# Patient Record
Sex: MALE | Race: WHITE | NOT HISPANIC OR LATINO | Employment: FULL TIME | URBAN - METROPOLITAN AREA
[De-identification: names, ages, dates, MRNs, and addresses within clinical notes are randomized per-mention and may not be internally consistent; named-entity substitution may affect disease eponyms.]

---

## 2020-04-01 ENCOUNTER — OFFICE VISIT (OUTPATIENT)
Dept: URGENT CARE | Facility: CLINIC | Age: 55
End: 2020-04-01
Payer: OTHER GOVERNMENT

## 2020-04-01 VITALS
SYSTOLIC BLOOD PRESSURE: 134 MMHG | HEART RATE: 68 BPM | TEMPERATURE: 98 F | OXYGEN SATURATION: 97 % | HEIGHT: 66 IN | BODY MASS INDEX: 25.71 KG/M2 | DIASTOLIC BLOOD PRESSURE: 80 MMHG | WEIGHT: 160 LBS

## 2020-04-01 DIAGNOSIS — M25.461 SWELLING OF RIGHT KNEE JOINT: ICD-10-CM

## 2020-04-01 DIAGNOSIS — S83.91XA SPRAIN OF RIGHT KNEE, UNSPECIFIED LIGAMENT, INITIAL ENCOUNTER: Primary | ICD-10-CM

## 2020-04-01 PROCEDURE — 99204 OFFICE O/P NEW MOD 45 MIN: CPT | Mod: S$GLB,,, | Performed by: PREVENTIVE MEDICINE

## 2020-04-01 PROCEDURE — 99204 PR OFFICE/OUTPT VISIT, NEW, LEVL IV, 45-59 MIN: ICD-10-PCS | Mod: S$GLB,,, | Performed by: PREVENTIVE MEDICINE

## 2020-04-01 RX ORDER — MELOXICAM 7.5 MG/1
7.5 TABLET ORAL 2 TIMES DAILY WITH MEALS
Qty: 30 TABLET | Refills: 1 | Status: SHIPPED | OUTPATIENT
Start: 2020-04-01 | End: 2020-04-13 | Stop reason: SDUPTHER

## 2020-04-01 NOTE — PROGRESS NOTES
Subjective:       Patient ID: Oscar Oswald is a 54 y.o. male.    Chief Complaint: Knee Injury (right)    NEW WC-Pt is being seen today for his right knee injury - [place] -  DOI  0325/2020 . 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 The current episode started in the past 7 days. Pertinent negatives include no arthralgias, chest pain, chills, congestion, myalgias, numbness or weakness. The symptoms are aggravated by walking. He has tried ice for the symptoms. The treatment provided mild relief.       Constitution: Negative for chills.   HENT: Negative for congestion, sinus pain and sinus pressure.    Cardiovascular: Negative for chest pain.   Musculoskeletal: Positive for pain with walking. Negative for pain, joint pain, muscle cramps and muscle ache.   Allergic/Immunologic: Negative for itching and sneezing.   Neurological: Negative for numbness and tingling.        Objective:      Physical Exam   Constitutional: He is oriented to person, place, and time. He appears well-developed and well-nourished.   HENT:   Head: Normocephalic.   Eyes: Pupils are equal, round, and reactive to light.   Neck: Normal range of motion.   Cardiovascular: Normal rate.   Pulmonary/Chest: Effort normal.   Musculoskeletal:        Right knee: He exhibits decreased range of motion and swelling. He exhibits no effusion, no ecchymosis, no deformity, no laceration, no erythema, normal alignment and no LCL laxity. Tenderness found. No medial joint line, no lateral joint line, no MCL, no LCL and no patellar tendon tenderness noted.        Legs:  Swelling with mild pain is noted with palpation about the medial and lateral aspects of the right knee.  Patient has minimal pain with flexion of the right  knee to approximately 90°, and extension to approximately 180°.  He has no pain with anterior posterior drawer signs.  Param's sign is negative.  Distal pulses are equal intact.  He has no swelling or ecchymosis about his right lower extremity.  He has no other signs of injury at this time.   Neurological: He is alert and oriented to person, place, and time.   Skin: Skin is warm and dry.   Psychiatric: He has a normal mood and affect.   Nursing note and vitals reviewed.      Assessment:       1. Sprain of right knee, unspecified ligament, initial encounter    2. Swelling of right knee joint        Plan:     discussed diagnosis with patient and the need to elevate and reduce weight-bearing at this time.  He will return to clinic in 5 days and may need x-rays and or MRI of the right knee if not improved.    Medications Ordered This Encounter   Medications    meloxicam (MOBIC) 7.5 MG tablet     Sig: Take 1 tablet (7.5 mg total) by mouth 2 (two) times daily with meals.     Dispense:  30 tablet     Refill:  1     Patient Instructions: Daily home exercises/warm soaks, Elevated affected area   Restrictions: Disabled until next office visit  Follow up in about 5 days (around 4/6/2020).

## 2020-04-01 NOTE — LETTER
[facility]  [address], [address]  [place] [address]  Phone: [phone]  Fax: [phone]  [facility]: [id number]    Pt Name: Oscar Ellis Date: 03/25/2020   Employee ID: [id number] Date of First Treatment: 04/01/2020   Company: [employer]      Appointment Time: 03:55 PM Arrived: 4:10pm   Provider: Jaleel Herrera MD Time Out:5:15     Office Treatment:     1. Sprain of right knee, unspecified ligament, initial encounter    2. Swelling of right knee joint      Medications Ordered This Encounter   Medications    meloxicam (MOBIC) 7.5 MG tablet      Patient Instructions: Daily home exercises/warm soaks, Elevated affected area    Restrictions: Disabled until next office visit     Return Appointment: 4/6/2020 at 2:30pm    LN

## 2020-04-06 ENCOUNTER — OFFICE VISIT (OUTPATIENT)
Dept: URGENT CARE | Facility: CLINIC | Age: 55
End: 2020-04-06
Payer: OTHER GOVERNMENT

## 2020-04-06 ENCOUNTER — TELEPHONE (OUTPATIENT)
Dept: URGENT CARE | Facility: CLINIC | Age: 55
End: 2020-04-06

## 2020-04-06 VITALS — TEMPERATURE: 98 F | OXYGEN SATURATION: 98 %

## 2020-04-06 DIAGNOSIS — S83.91XD SPRAIN OF RIGHT KNEE, UNSPECIFIED LIGAMENT, SUBSEQUENT ENCOUNTER: Primary | ICD-10-CM

## 2020-04-06 DIAGNOSIS — M25.461 SWELLING OF RIGHT KNEE JOINT: ICD-10-CM

## 2020-04-06 PROCEDURE — 99213 PR OFFICE/OUTPT VISIT, EST, LEVL III, 20-29 MIN: ICD-10-PCS | Mod: S$GLB,,, | Performed by: NURSE PRACTITIONER

## 2020-04-06 PROCEDURE — 99213 OFFICE O/P EST LOW 20 MIN: CPT | Mod: S$GLB,,, | Performed by: NURSE PRACTITIONER

## 2020-04-06 NOTE — LETTER
[facility] [place]  [address], [address]  [place] [address]  Phone: [phone]  Fax: [phone]  [facility]: [id number]    Pt Name: Oscar Ellis Date: 03/25/2020   Employee ID:  Date of Treatment: 04/06/2020   Company: [employer]      Appointment Time: 02:15 PM Arrived:14:30  pm   Provider: OCCUPATIONAL HEALTH [facility] Time Out 12:34  pm     Office Treatment:   EXAM  REGULAR DUTY  DISCHARGED OCCUPATIONAL HEALTH    1. Sprain of right knee, unspecified ligament, subsequent encounter    2. Swelling of right knee joint          Patient Instructions: Attention not to aggravate affected area, Daily home exercises/warm soaks      Restrictions: Regular Duty, Discharged from Occupational Health     Return Appointment: DISCHARGED

## 2020-04-06 NOTE — PROGRESS NOTES
Subjective:       Patient ID: Oscar Oswald is a 54 y.o. male.    Chief Complaint: Knee Injury (RIGHT)    , KNEE  DOI:  03/25/2020  DOING WELL, VERY LITTLE PAIN, WITH A PAIN LEVEL OF 0 TODAY.  CS    Knee Injury   This is a new problem. The current episode started 1 to 4 weeks ago. The problem occurs rarely. The problem has been gradually improving. Pertinent negatives include no arthralgias, chest pain, chills, congestion, coughing, fatigue, fever, headaches, joint swelling, myalgias, nausea, rash, sore throat, vertigo or vomiting. Nothing aggravates the symptoms. He has tried heat (antiinflamatory medication) for the symptoms. The treatment provided significant relief.       Constitution: Negative for chills, fatigue and fever.   HENT: Negative for congestion and sore throat.    Neck: Negative for painful lymph nodes.   Cardiovascular: Negative for chest pain and leg swelling.   Eyes: Negative for double vision and blurred vision.   Respiratory: Negative for cough and shortness of breath.    Gastrointestinal: Negative for nausea, vomiting and diarrhea.   Genitourinary: Negative for dysuria, frequency and urgency.   Musculoskeletal: Negative for pain, joint pain, joint swelling, pain with walking, muscle cramps and muscle ache.   Skin: Negative for color change, pale, rash and erythema.   Allergic/Immunologic: Negative for seasonal allergies.   Neurological: Negative for dizziness, history of vertigo, light-headedness, passing out and headaches.   Hematologic/Lymphatic: Negative for swollen lymph nodes, easy bruising/bleeding and history of blood clots. Does not bruise/bleed easily.   Psychiatric/Behavioral: Negative for nervous/anxious, sleep disturbance and depression. The patient is not nervous/anxious.         Objective:      Physical Exam   Constitutional: He is oriented to person, place, and time. He appears well-developed and well-nourished. No distress.   HENT:   Right Ear: External ear  normal.   Left Ear: External ear normal.   Nose: Nose normal.   Eyes: Conjunctivae are normal.   Cardiovascular: Intact distal pulses.   Pulmonary/Chest: Effort normal.   Musculoskeletal: Normal range of motion. He exhibits no tenderness.        Right knee: He exhibits normal range of motion, no swelling, no effusion, no ecchymosis, no erythema, normal alignment, no LCL laxity and no MCL laxity. No tenderness found.   FROM to R knee. Mild pain with full squat. Ambulating well. Swelling and pain has resolved. Neg anterior & posterior drawer signs. Crepitus noted to both knees. Neg Param's.   Neurological: He is alert and oriented to person, place, and time.   Skin: Skin is warm and dry. No erythema.   Psychiatric: He has a normal mood and affect. His behavior is normal. Judgment and thought content normal.       Assessment:       1. Sprain of right knee, unspecified ligament, subsequent encounter    2. Swelling of right knee joint        Plan:       Pt is feeling much better today. Discussed case with Dr Herrera. He will return to regular duty.     Patient Instructions: Attention not to aggravate affected area, Daily home exercises/warm soaks   Restrictions: Regular Duty, Discharged from Occupational Health  Follow up if symptoms worsen or fail to improve.

## 2020-04-06 NOTE — TELEPHONE ENCOUNTER
Patient called in reference to a phone call he received on Friday and on the message that was left, he states to call back before his coming to his appointment on 4/6. I informed him that I would get someone to call him back.      By Leesa Streeter MA

## 2020-04-13 ENCOUNTER — OFFICE VISIT (OUTPATIENT)
Dept: URGENT CARE | Facility: CLINIC | Age: 55
End: 2020-04-13
Payer: OTHER GOVERNMENT

## 2020-04-13 VITALS — TEMPERATURE: 98 F | OXYGEN SATURATION: 98 %

## 2020-04-13 DIAGNOSIS — S83.241D ACUTE MEDIAL MENISCUS TEAR OF RIGHT KNEE, SUBSEQUENT ENCOUNTER: ICD-10-CM

## 2020-04-13 DIAGNOSIS — M25.461 SWELLING OF RIGHT KNEE JOINT: ICD-10-CM

## 2020-04-13 DIAGNOSIS — S83.91XA SPRAIN OF RIGHT KNEE, UNSPECIFIED LIGAMENT, INITIAL ENCOUNTER: ICD-10-CM

## 2020-04-13 DIAGNOSIS — M23.91 INTERNAL DERANGEMENT OF RIGHT KNEE: ICD-10-CM

## 2020-04-13 DIAGNOSIS — S83.91XD SPRAIN OF RIGHT KNEE, UNSPECIFIED LIGAMENT, SUBSEQUENT ENCOUNTER: Primary | ICD-10-CM

## 2020-04-13 PROCEDURE — 99214 PR OFFICE/OUTPT VISIT, EST, LEVL IV, 30-39 MIN: ICD-10-PCS | Mod: S$GLB,,, | Performed by: PREVENTIVE MEDICINE

## 2020-04-13 PROCEDURE — 99214 OFFICE O/P EST MOD 30 MIN: CPT | Mod: S$GLB,,, | Performed by: PREVENTIVE MEDICINE

## 2020-04-13 RX ORDER — MELOXICAM 7.5 MG/1
7.5 TABLET ORAL 2 TIMES DAILY WITH MEALS
Qty: 30 TABLET | Refills: 1 | Status: SHIPPED | OUTPATIENT
Start: 2020-04-13 | End: 2020-06-17 | Stop reason: SDUPTHER

## 2020-04-13 NOTE — PROGRESS NOTES
Subjective:       Patient ID: Oscar Oswald is a 54 y.o. male.    Chief Complaint: Knee Injury (RT)    WC Follow-up of RT Knee Injury ( DOI 03- ) Was discharged 04- returning with 7/10 pain and complaints of Intermittent pain in back of knee when walking, swelling. Taking Mobic.       Constitution: Negative for chills, fatigue and fever.   HENT: Negative for congestion and sore throat.    Neck: Negative for painful lymph nodes.   Cardiovascular: Negative for chest pain and leg swelling.   Eyes: Negative for double vision and blurred vision.   Respiratory: Negative for cough and shortness of breath.    Gastrointestinal: Negative for nausea, vomiting and diarrhea.   Genitourinary: Negative for dysuria, frequency and urgency.   Musculoskeletal: Positive for pain, joint pain, joint swelling and pain with walking. Negative for muscle cramps and muscle ache.   Skin: Negative for color change, pale and rash.   Allergic/Immunologic: Negative for seasonal allergies.   Neurological: Negative for dizziness, history of vertigo, light-headedness, passing out, headaches, numbness and tingling.   Hematologic/Lymphatic: Negative for swollen lymph nodes, easy bruising/bleeding and history of blood clots. Does not bruise/bleed easily.   Psychiatric/Behavioral: Negative for nervous/anxious, sleep disturbance and depression. The patient is not nervous/anxious.         Objective:      Physical Exam   Constitutional: He is oriented to person, place, and time. He appears well-developed and well-nourished.   HENT:   Head: Normocephalic.   Eyes: Pupils are equal, round, and reactive to light.   Neck: Normal range of motion.   Cardiovascular: Normal rate.   Pulmonary/Chest: Effort normal.   Musculoskeletal:        Right knee: He exhibits decreased range of motion and swelling. He exhibits no effusion, no ecchymosis, no deformity, no laceration, no erythema, normal alignment and no LCL laxity. Tenderness found. No medial  joint line, no lateral joint line, no MCL, no LCL and no patellar tendon tenderness noted.        Legs:  Swelling with mild pain is noted with palpation about the medial and lateral aspects of the right knee.  Patient has minimal pain with flexion of the right knee to approximately 90°, and extension to approximately 180°.  He has no pain with anterior posterior drawer signs.  Param's sign is negative.  Distal pulses are equal intact.  He has no swelling or ecchymosis about his right lower extremity.  He has no other signs of injury at this time.   Neurological: He is alert and oriented to person, place, and time.   Skin: Skin is warm and dry.   Psychiatric: He has a normal mood and affect.   Nursing note and vitals reviewed.      Assessment:       1. Sprain of right knee, unspecified ligament, subsequent encounter    2. Swelling of right knee joint    3. Internal derangement of right knee    4. 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  This has persisted over the past several days and now he is unable to apply weight-bearing without significant pain or developing increased swelling.  Therefore an MRI of the right knee will be ordered at this time.    Medications Ordered This Encounter   Medications    meloxicam (MOBIC) 7.5 MG tablet     Sig: Take 1 tablet (7.5 mg total) by mouth 2 (two) times daily with meals.     Dispense:  30 tablet     Refill:  1     Patient Instructions: Daily home exercises/warm soaks, MRI to be scheduled once authorized   Restrictions: Sit or stand as needed, No lifting/pushing/pulling more than 25 lbs, No Prolonged standing/walking  Follow up in about 1 week (around 4/20/2020).

## 2020-04-13 NOTE — LETTER
[facility] [place]  [address], [address]  [place] [address]  Phone: [phone]  Fax: [phone]  [facility]: [id number]    Pt Name: Oscar Ellis Date: 03/25/2020   Employee ID: [id number] Date of  Treatment: 04/13/2020   Company: [employer]      Appointment Time: 12:55 PM Arrived: 1:10pm   Provider: Jaleel Herrera MD Time Out:2:05pm     Office Treatment:     1. Sprain of right knee, unspecified ligament, subsequent encounter    2. Swelling of right knee joint    3. Internal derangement of right knee    4. Sprain of right knee, unspecified ligament, initial encounter      Medications Ordered This Encounter   Medications    meloxicam (MOBIC) 7.5 MG tablet      Patient Instructions: Daily home exercises/warm soaks, MRI to be scheduled once authorized    Restrictions: Sit or stand as needed, No lifting/pushing/pulling more than 25 lbs, No Prolonged standing/walking     Return Appointment: 4/20/2020 at *8:30am    LN

## 2020-04-21 ENCOUNTER — HOSPITAL ENCOUNTER (OUTPATIENT)
Dept: RADIOLOGY | Facility: HOSPITAL | Age: 55
Discharge: HOME | End: 2020-04-21
Attending: PREVENTIVE MEDICINE
Payer: OTHER GOVERNMENT

## 2020-04-21 DIAGNOSIS — M23.91 INTERNAL DERANGEMENT OF RIGHT KNEE: ICD-10-CM

## 2020-04-21 DIAGNOSIS — M25.461 SWELLING OF RIGHT KNEE JOINT: ICD-10-CM

## 2020-04-21 DIAGNOSIS — S83.91XD SPRAIN OF RIGHT KNEE, UNSPECIFIED LIGAMENT, SUBSEQUENT ENCOUNTER: ICD-10-CM

## 2020-04-21 DIAGNOSIS — S83.91XA SPRAIN OF RIGHT KNEE, UNSPECIFIED LIGAMENT, INITIAL ENCOUNTER: ICD-10-CM

## 2020-04-21 PROCEDURE — 73721 MRI JNT OF LWR EXTRE W/O DYE: CPT | Mod: TC,RT

## 2020-04-21 PROCEDURE — 73721 MRI JNT OF LWR EXTRE W/O DYE: CPT | Mod: 26,RT,, | Performed by: RADIOLOGY

## 2020-04-21 PROCEDURE — 73721 MRI KNEE WITHOUT CONTRAST RIGHT: ICD-10-PCS | Mod: 26,RT,, | Performed by: RADIOLOGY

## 2020-04-22 ENCOUNTER — OFFICE VISIT (OUTPATIENT)
Dept: URGENT CARE | Facility: CLINIC | Age: 55
End: 2020-04-22
Payer: OTHER GOVERNMENT

## 2020-04-22 DIAGNOSIS — M25.461 SWELLING OF RIGHT KNEE JOINT: ICD-10-CM

## 2020-04-22 DIAGNOSIS — S83.241D ACUTE MEDIAL MENISCUS TEAR OF RIGHT KNEE, SUBSEQUENT ENCOUNTER: Primary | ICD-10-CM

## 2020-04-22 DIAGNOSIS — M23.91 INTERNAL DERANGEMENT OF RIGHT KNEE: ICD-10-CM

## 2020-04-22 DIAGNOSIS — S83.91XD SPRAIN OF RIGHT KNEE, UNSPECIFIED LIGAMENT, SUBSEQUENT ENCOUNTER: ICD-10-CM

## 2020-04-22 PROCEDURE — 99214 OFFICE O/P EST MOD 30 MIN: CPT | Mod: S$GLB,,, | Performed by: PREVENTIVE MEDICINE

## 2020-04-22 PROCEDURE — 99214 PR OFFICE/OUTPT VISIT, EST, LEVL IV, 30-39 MIN: ICD-10-PCS | Mod: S$GLB,,, | Performed by: PREVENTIVE MEDICINE

## 2020-04-22 NOTE — LETTER
[facility] [place]  [address], [address]  [place] [address]  Phone: [phone]  Fax: [phone]  [facility]: [id number]    Pt Name: Oscar Ellis Date: 03/25/2020   Employee ID: [id number] Date of Treatment: 04/22/2020   Company: [employer]      Appointment Time: 03:55 PM Arrived: 4:09 p.m.   Provider: Jaleel Herrera MD Time Out: 4:49 p.m.     Office Treatment:   EXAM  Referral to Specialist  Restrictions    1. Acute medial meniscus tear of right knee, subsequent encounter    2. Swelling of right knee joint    3. Internal derangement of right knee    4. Sprain of right knee, unspecified ligament, subsequent encounter          Patient Instructions: Referral to specialist to be scheduled, once authorized    Restrictions: Sit or stand as needed, Avoid climbing/kneeling/squatting     Return Appointment: 5/20/2020 at 2:00 p.m.  [place]

## 2020-04-22 NOTE — PROGRESS NOTES
Subjective:       Patient ID: Oscar Oswald is a 54 y.o. male.    Chief Complaint: Knee Injury (Right)    Pt is being seen today for a follow up for a Right Knee injury from 3/25/2020.  Pt continues to have pain in his Right Knee with a pain level of 4/10.  He states that he had his MRI on 4/21/2020.  KD      Constitution: Negative for fatigue.   HENT: Negative for facial swelling and facial trauma.    Neck: Negative for neck stiffness.   Cardiovascular: Negative for chest trauma.   Eyes: Negative for eye trauma, double vision and blurred vision.   Gastrointestinal: Negative for abdominal trauma, abdominal pain and rectal bleeding.   Genitourinary: Negative for hematuria, genital trauma and pelvic pain.   Musculoskeletal: Positive for pain, joint pain, joint swelling and pain with walking. Negative for trauma and abnormal ROM of joint.   Skin: Negative for color change, wound, abrasion and laceration.   Neurological: Negative for dizziness, history of vertigo, light-headedness, coordination disturbances, altered mental status and loss of consciousness.   Hematologic/Lymphatic: Negative for history of bleeding disorder.   Psychiatric/Behavioral: Negative for altered mental status.        Objective:      Physical Exam   Constitutional: He is oriented to person, place, and time. He appears well-developed and well-nourished.   HENT:   Head: Normocephalic.   Eyes: Pupils are equal, round, and reactive to light.   Neck: Normal range of motion.   Cardiovascular: Normal rate.   Pulmonary/Chest: Effort normal.   Musculoskeletal:        Right knee: He exhibits decreased range of motion and swelling. He exhibits no effusion, no ecchymosis, no deformity, no laceration, no erythema, normal alignment and no LCL laxity. Tenderness found. No medial joint line, no lateral joint line, no MCL, no LCL and no patellar tendon tenderness noted.        Legs:  Swelling with mild pain is noted with palpation about the medial and lateral  aspects of the right knee.  Patient has minimal pain with flexion of the right knee to approximately 90°, and extension to approximately 180°.  He has no pain with anterior posterior drawer signs.  Param's sign is negative.  Distal pulses are equal intact.  He has no swelling or ecchymosis about his right lower extremity.  He has no other signs of injury at this time.   Neurological: He is alert and oriented to person, place, and time.   Skin: Skin is warm and dry.   Psychiatric: He has a normal mood and affect.   Nursing note and vitals reviewed.      Assessment:       1. Acute medial meniscus tear of right knee, subsequent encounter    2. Swelling of right knee joint    3. Internal derangement of right knee    4. Sprain of right knee, unspecified ligament, subsequent encounter        Plan:       Discussed at length the results of the MRI of the right knee which revealed a torn medial meniscus posterior aspect.  In addition there is evidence of a rupture of the baker cyst with corresponding edema.  Patient expressed understanding of these results and the need for at orthopedic surgery consultation.  This has been placed as a referral and he will continue his restricted duties at this time.  This will be maintained for at least a month or until such time as he follows up with orthopedic surgery.     Patient Instructions: Referral to specialist to be scheduled, once authorized   Restrictions: Sit or stand as needed, Avoid climbing/kneeling/squatting  Follow up in about 4 weeks (around 5/20/2020).

## 2020-05-01 ENCOUNTER — TELEPHONE (OUTPATIENT)
Dept: URGENT CARE | Facility: CLINIC | Age: 55
End: 2020-05-01

## 2020-05-01 NOTE — TELEPHONE ENCOUNTER
Patient call inquiring about his referral for the Orthopedic. He states that Dr. Herrera told him that he had not heard from us in a week that he can call to check on his referral. I checked and let the patient know that it had not been approved as of today yet, and he said ok.

## 2020-05-20 ENCOUNTER — OFFICE VISIT (OUTPATIENT)
Dept: URGENT CARE | Facility: CLINIC | Age: 55
End: 2020-05-20
Payer: OTHER GOVERNMENT

## 2020-05-20 DIAGNOSIS — S83.91XD SPRAIN OF RIGHT KNEE, UNSPECIFIED LIGAMENT, SUBSEQUENT ENCOUNTER: ICD-10-CM

## 2020-05-20 DIAGNOSIS — M25.461 SWELLING OF RIGHT KNEE JOINT: ICD-10-CM

## 2020-05-20 DIAGNOSIS — S83.241D ACUTE MEDIAL MENISCUS TEAR OF RIGHT KNEE, SUBSEQUENT ENCOUNTER: Primary | ICD-10-CM

## 2020-05-20 DIAGNOSIS — M23.91 INTERNAL DERANGEMENT OF RIGHT KNEE: ICD-10-CM

## 2020-05-20 PROCEDURE — 99214 OFFICE O/P EST MOD 30 MIN: CPT | Mod: S$GLB,,, | Performed by: PREVENTIVE MEDICINE

## 2020-05-20 PROCEDURE — 99214 PR OFFICE/OUTPT VISIT, EST, LEVL IV, 30-39 MIN: ICD-10-PCS | Mod: S$GLB,,, | Performed by: PREVENTIVE MEDICINE

## 2020-05-20 NOTE — PROGRESS NOTES
Subjective:       Patient ID: Oscar Oswald is a 54 y.o. male.    Chief Complaint: Knee Injury (right)    , LEFT KNEE, DOI:03/25/2020  PATIENT STATES NOT MUCH CHANGE. HE ALSO HAS NOT BEEN CONTACTED [facility].  CS    Knee Injury   This is a recurrent problem. The current episode started more than 1 month ago. The problem occurs intermittently. The problem has been gradually improving. Associated symptoms include arthralgias. Pertinent negatives include no chest pain, chills, congestion, coughing, fatigue, fever, headaches, myalgias, nausea, rash, sore throat, vertigo or vomiting. The symptoms are aggravated by walking. He has tried nothing for the symptoms. The treatment provided mild relief.       Constitution: Negative for chills, fatigue and fever.   HENT: Negative for congestion and sore throat.    Neck: Negative for painful lymph nodes.   Cardiovascular: Negative for chest pain and leg swelling.   Eyes: Negative for double vision and blurred vision.   Respiratory: Negative for cough and shortness of breath.    Gastrointestinal: Negative for nausea, vomiting and diarrhea.   Genitourinary: Negative for dysuria, frequency and urgency.   Musculoskeletal: Positive for joint pain. Negative for muscle cramps and muscle ache.   Skin: Negative for color change, pale and rash.   Allergic/Immunologic: Negative for seasonal allergies.   Neurological: Negative for dizziness, history of vertigo, light-headedness, passing out and headaches.   Hematologic/Lymphatic: Negative for swollen lymph nodes, easy bruising/bleeding and history of blood clots. Does not bruise/bleed easily.   Psychiatric/Behavioral: Negative for nervous/anxious, sleep disturbance and depression. The patient is not nervous/anxious.         Objective:      Physical Exam   Constitutional: He is oriented to person, place, and time. He appears well-developed and well-nourished.   HENT:   Head: Normocephalic.   Eyes: Pupils are equal, round,  and reactive to light.   Neck: Normal range of motion.   Cardiovascular: Normal rate.   Pulmonary/Chest: Effort normal.   Musculoskeletal:        Right knee: He exhibits decreased range of motion and swelling. He exhibits no effusion, no ecchymosis, no deformity, no laceration, no erythema, normal alignment and no LCL laxity. Tenderness found. No medial joint line, no lateral joint line, no MCL, no LCL and no patellar tendon tenderness noted.        Legs:  Swelling with mild pain is noted with palpation about the medial and lateral aspects of the right knee.  Patient has minimal pain with flexion of the right knee to approximately 90°, and extension to approximately 180°.  He has no pain with anterior posterior drawer signs.  Param's sign is negative.  Distal pulses are equal intact.  He has no swelling or ecchymosis about his right lower extremity.  He has no other signs of injury at this time.   Neurological: He is alert and oriented to person, place, and time.   Skin: Skin is warm and dry.   Psychiatric: He has a normal mood and affect.   Nursing note and vitals reviewed.      Assessment:       1. Acute medial meniscus tear of right knee, subsequent encounter    2. Internal derangement of right knee    3. Swelling of right knee joint    4. Sprain of right knee, unspecified ligament, subsequent encounter        Plan:     patient has yet to be evaluated by orthopedic surgery despite his diagnosis of acute medial meniscus tear.  This referral has not apparently been approved by the department of labor.  Patient will maintain his work restrictions and follow-up with orthopedic surgery.  He may return to this clinic only if he has not had a follow-up with the specialist.       Patient Instructions: Daily home exercises/warm soaks, Referral to specialist to be scheduled, once authorized(Patient needs referral to orthopedic surgery)   Restrictions: Sit or stand as needed, Avoid climbing/kneeling/squatting  Follow up  in about 4 weeks (around 6/17/2020).

## 2020-05-20 NOTE — LETTER
[facility] [place]  [address], [address]  [place] [address]  Phone: [phone]  Fax: [phone]  [facility]: [id number]    Pt Name: Oscar Ellis Date: 03/25/2020   Employee ID: [id number] Date of  Treatment: 05/20/2020   Company: [email or web address]      Appointment Time: 01:45 PM Arrived: 1:47pm   Provider: Jaleel Herrera MD Time Out:4:00pm     Office Treatment:     1. Acute medial meniscus tear of right knee, subsequent encounter    2. Internal derangement of right knee    3. Swelling of right knee joint    4. Sprain of right knee, unspecified ligament, subsequent encounter          Patient Instructions: Daily home exercises/warm soaks, Referral to specialist to be scheduled, once authorized(Patient needs referral to orthopedic surgery)    Restrictions: Sit or stand as needed, Avoid climbing/kneeling/squatting     Return Appointment: 6/17/2020 at 2:00pm    ANITA

## 2020-06-11 ENCOUNTER — TELEPHONE (OUTPATIENT)
Dept: SPORTS MEDICINE | Facility: CLINIC | Age: 55
End: 2020-06-11

## 2020-06-11 NOTE — TELEPHONE ENCOUNTER
Spoke to patient about messages below and scheduled him for an appointment on 7/8 with Dr. Diehl. Will reach out to the patient if anything wook become available for a sooner date.     ----- Message from Meggan Donnelly MA sent at 6/9/2020  1:21 PM CDT -----  Regarding: RE:  Patient   Dr. Diehl will see this patient.    Thank you,    Meggan Donnelly  Sports Medicine Assistant  [facility]  [phone] (p)  [phone](f)    ----- Message -----  From: Shima Meyer MA  Sent: 6/8/2020   6:56 AM CDT  To: [facility] YADAV Staff  Subject: WC Patient                                       Good morning,    This patient is being treated by occupational health for a knee injury. They completed an MRI and are referring him to Sports Medicine for a meniscus tear.    Meggan,  Can you please review and let me know if Dr. Diehl will see him?    Constanza,  If Dr. Diehl will see this patient, please call to get him scheduled. Occupational Health already got the approval from his workers' comp.    Thanks,  Shima Meyer MA  [facility]

## 2020-06-17 ENCOUNTER — OFFICE VISIT (OUTPATIENT)
Dept: URGENT CARE | Facility: CLINIC | Age: 55
End: 2020-06-17
Payer: OTHER GOVERNMENT

## 2020-06-17 DIAGNOSIS — M25.461 SWELLING OF RIGHT KNEE JOINT: ICD-10-CM

## 2020-06-17 DIAGNOSIS — M23.91 INTERNAL DERANGEMENT OF RIGHT KNEE: ICD-10-CM

## 2020-06-17 DIAGNOSIS — S83.241D ACUTE MEDIAL MENISCUS TEAR OF RIGHT KNEE, SUBSEQUENT ENCOUNTER: Primary | ICD-10-CM

## 2020-06-17 DIAGNOSIS — S83.91XA SPRAIN OF RIGHT KNEE, UNSPECIFIED LIGAMENT, INITIAL ENCOUNTER: ICD-10-CM

## 2020-06-17 PROCEDURE — 99214 OFFICE O/P EST MOD 30 MIN: CPT | Mod: S$GLB,,, | Performed by: PREVENTIVE MEDICINE

## 2020-06-17 PROCEDURE — 99214 PR OFFICE/OUTPT VISIT, EST, LEVL IV, 30-39 MIN: ICD-10-PCS | Mod: S$GLB,,, | Performed by: PREVENTIVE MEDICINE

## 2020-06-17 RX ORDER — MELOXICAM 7.5 MG/1
7.5 TABLET ORAL 2 TIMES DAILY WITH MEALS
Qty: 30 TABLET | Refills: 1 | Status: SHIPPED | OUTPATIENT
Start: 2020-06-17

## 2020-06-17 NOTE — LETTER
[facility] [place]  [address], [address]  [place] [address]  Phone: [phone]  Fax: [phone]  [facility]: [email or web address]    Pt Name: Oscar Ellis Date: 03/25/2020   Employee ID: [id number] Date of Treatment: 06/17/2020   Company: [employer]      Appointment Time: 02:00 PM Arrived: 1:40 PM   Provider: Jaleel Herrera MD Time Out: 2:40 PM     Office Treatment:   1. Acute medial meniscus tear of right knee, subsequent encounter    2. Internal derangement of right knee    3. Swelling of right knee joint    4. Sprain of right knee, unspecified ligament, initial encounter      Medications Ordered This Encounter   Medications    meloxicam (MOBIC) 7.5 MG tablet      Patient Instructions: Referral to specialist to be scheduled, once authorized    Restrictions: Sit or stand as needed, Avoid climbing/kneeling/squatting     Return Appointment:   6/24/2020 at 2:00 PM       UTE

## 2020-06-17 NOTE — PROGRESS NOTES
Subjective:       Patient ID: Oscar Oswald is a 54 y.o. male.    Chief Complaint: Knee Injury (Right)    Pt is being seen today for a follow up for a Right Knee injury from 3/25/2020.  He states that his pain level today  Is 3/10.  He has an appointment with the Orthopedist on 7/8/2020.  KD      Constitution: Negative for fatigue and fever.   HENT: Negative for facial swelling and facial trauma.    Neck: Negative for neck stiffness.   Cardiovascular: Negative for chest trauma.   Eyes: Negative for eye trauma, double vision and blurred vision.   Gastrointestinal: Negative for abdominal trauma, abdominal pain and rectal bleeding.   Genitourinary: Negative for hematuria, genital trauma and pelvic pain.   Musculoskeletal: Positive for pain, trauma, joint pain, joint swelling and pain with walking. Negative for abnormal ROM of joint.   Skin: Negative for color change, wound, abrasion and laceration.   Neurological: Negative for dizziness, history of vertigo, light-headedness, coordination disturbances, altered mental status and loss of consciousness.   Hematologic/Lymphatic: Negative for history of bleeding disorder.   Psychiatric/Behavioral: Negative for altered mental status.        Objective:      Physical Exam  Vitals signs and nursing note reviewed.   Constitutional:       Appearance: He is well-developed.   HENT:      Head: Normocephalic.   Eyes:      Pupils: Pupils are equal, round, and reactive to light.   Neck:      Musculoskeletal: Normal range of motion.   Cardiovascular:      Rate and Rhythm: Normal rate.   Pulmonary:      Effort: Pulmonary effort is normal.   Musculoskeletal:      Right knee: He exhibits decreased range of motion and swelling. He exhibits no effusion, no ecchymosis, no deformity, no laceration, no erythema, normal alignment and no LCL laxity. Tenderness found. No medial joint line, no lateral joint line, no MCL, no LCL and no patellar tendon tenderness noted.        Legs:       Comments:  Swelling with mild pain is noted with palpation about the medial and lateral aspects of the right knee.  Patient has minimal pain with flexion of the right knee to approximately 90°, and extension to approximately 180°.  He has no pain with anterior posterior drawer signs.  Param's sign is negative.  Distal pulses are equal intact.  He has no swelling or ecchymosis about his right lower extremity.  He has no other signs of injury at this time.   Skin:     General: Skin is warm and dry.   Neurological:      Mental Status: He is alert and oriented to person, place, and time.         Assessment:       1. Acute medial meniscus tear of right knee, subsequent encounter    2. Internal derangement of right knee    3. Swelling of right knee joint    4. Sprain of right knee, unspecified ligament, initial encounter        Plan:     patient has yet to be evaluated by the orthopedic surgery department at [facility].  An attempt will be made to move his appointment up from July for 8th which is when he is scheduled at this time.  He will continues work restrictions until this time.    Medications Ordered This Encounter   Medications    meloxicam (MOBIC) 7.5 MG tablet     Sig: Take 1 tablet (7.5 mg total) by mouth 2 (two) times daily with meals.     Dispense:  30 tablet     Refill:  1     Patient Instructions: Referral to specialist to be scheduled, once authorized   Restrictions: Sit or stand as needed, Avoid climbing/kneeling/squatting  Follow up in about 1 week (around 6/24/2020).

## 2020-06-19 ENCOUNTER — HOSPITAL ENCOUNTER (OUTPATIENT)
Dept: RADIOLOGY | Facility: HOSPITAL | Age: 55
Discharge: HOME | End: 2020-06-19
Attending: ORTHOPAEDIC SURGERY
Payer: OTHER GOVERNMENT

## 2020-06-19 ENCOUNTER — OFFICE VISIT (OUTPATIENT)
Dept: SPORTS MEDICINE | Facility: CLINIC | Age: 55
End: 2020-06-19
Payer: OTHER GOVERNMENT

## 2020-06-19 VITALS
WEIGHT: 160 LBS | BODY MASS INDEX: 25.71 KG/M2 | SYSTOLIC BLOOD PRESSURE: 124 MMHG | DIASTOLIC BLOOD PRESSURE: 80 MMHG | HEIGHT: 66 IN | HEART RATE: 57 BPM

## 2020-06-19 DIAGNOSIS — S83.241A ACUTE MEDIAL MENISCUS TEAR OF RIGHT KNEE, INITIAL ENCOUNTER: Primary | ICD-10-CM

## 2020-06-19 DIAGNOSIS — Z01.818 PREOP TESTING: ICD-10-CM

## 2020-06-19 DIAGNOSIS — M25.561 RIGHT KNEE PAIN, UNSPECIFIED CHRONICITY: ICD-10-CM

## 2020-06-19 PROCEDURE — 99999 PR PBB SHADOW E&M-EST. PATIENT-LVL III: ICD-10-PCS | Mod: PBBFAC,,, | Performed by: ORTHOPAEDIC SURGERY

## 2020-06-19 PROCEDURE — 73564 X-RAY EXAM KNEE 4 OR MORE: CPT | Mod: TC,50

## 2020-06-19 PROCEDURE — 99999 PR PBB SHADOW E&M-EST. PATIENT-LVL III: CPT | Mod: PBBFAC,,, | Performed by: ORTHOPAEDIC SURGERY

## 2020-06-19 PROCEDURE — 73564 XR KNEE ORTHO BILAT WITH FLEXION: ICD-10-PCS | Mod: 26,50,, | Performed by: RADIOLOGY

## 2020-06-19 PROCEDURE — 99203 PR OFFICE/OUTPT VISIT, NEW, LEVL III, 30-44 MIN: ICD-10-PCS | Mod: S$GLB,,, | Performed by: ORTHOPAEDIC SURGERY

## 2020-06-19 PROCEDURE — 73564 X-RAY EXAM KNEE 4 OR MORE: CPT | Mod: 26,50,, | Performed by: RADIOLOGY

## 2020-06-19 PROCEDURE — 99203 OFFICE O/P NEW LOW 30 MIN: CPT | Mod: S$GLB,,, | Performed by: ORTHOPAEDIC SURGERY

## 2020-06-19 NOTE — LETTER
June 22, 2020      Jaleel Herrera MD  [address]  [address]  [place] [address]           [address] [address]  Phone: [phone]          Patient: Oscar Oswald   MR Number: [id number]   YOB: 1965   Date of Visit: 6/19/2020       Dear Dr. Jaleel Herrera:    Thank you for referring Oscar Oswald to me for evaluation. Attached you will find relevant portions of my assessment and plan of care.    If you have questions, please do not hesitate to call me. I look forward to following Oscar Oswald along with you.    Sincerely,    Carole Winn MD    Enclosure  CC:  No Recipients    If you would like to receive this communication electronically, please contact [email or web address] or [phone] to request more information on [employer] Link access.    For providers and/or their staff who would like to refer a patient to [facility], please contact us through our one-stop-shop provider referral line, [facility], at [phone].    If you feel you have received this communication in error or would no longer like to receive these types of communications, please e-mail [email or web address]

## 2020-06-19 NOTE — PROGRESS NOTES
CC: Right knee pain.  Referred by Dr. Herrera.    Works for [employer] office.    54 y.o. Male reports knee pain refractory to conservative mgmt.  Stepped off a curb 2 months ago, while at work (March 25, 2020).  Reports pain and significant swelling at the end of the day.  Was off work for a few days.  Then went back to work, with subsequent swelling and pain.  Knee was normal before this injury.  No catching, popping or locking.  Pain is on the medial and posterior aspect of the Right knee.  Tried NSAIDs and currently on limited duty at work.  NO physical therapy or injections.  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  This knee pain is interfering significantly with his ADLs.    He reports that the pain is worse with deep squats.  It also bothers him at night.    Is affecting ADLs.     - mechanical symptoms, no instability    Review of Systems   Constitution: Negative. Negative for chills, fever and night sweats.   HENT: Negative for congestion and headaches.    Eyes: Negative for blurred vision, left vision loss and right vision loss.   Cardiovascular: Negative for chest pain and syncope.   Respiratory: Negative for cough and shortness of breath.    Endocrine: Negative for polydipsia, polyphagia and polyuria.   Hematologic/Lymphatic: Negative for bleeding problem. Does not bruise/bleed easily.   Skin: Negative for dry skin, itching and rash.   Musculoskeletal: Negative for falls and muscle weakness.   Gastrointestinal: Negative for abdominal pain and bowel incontinence.   Genitourinary: Negative for bladder incontinence and nocturia.   Neurological: Negative for disturbances in coordination, loss of balance and seizures.   Psychiatric/Behavioral: Negative for depression. The patient does not have insomnia.    Allergic/Immunologic: Negative for hives and persistent infections.     PAST MEDICAL HISTORY: History reviewed. No pertinent past medical history.  PAST SURGICAL HISTORY: History reviewed. No pertinent  "surgical history.  FAMILY HISTORY: History reviewed. No pertinent family history.  SOCIAL HISTORY:   Social History     Socioeconomic History    Marital status:      Spouse name: Not on file    Number of children: Not on file    Years of education: Not on file    Highest education level: Not on file   Occupational History    Not on file   Social Needs    Financial resource strain: Not on file    Food insecurity     Worry: Not on file     Inability: Not on file    Transportation needs     Medical: Not on file     Non-medical: Not on file   Tobacco Use    Smoking status: Never Smoker    Smokeless tobacco: Never Used   Substance and Sexual Activity    Alcohol use: Not on file    Drug use: Not on file    Sexual activity: Not on file   Lifestyle    Physical activity     Days per week: Not on file     Minutes per session: Not on file    Stress: Not on file   Relationships    Social connections     Talks on phone: Not on file     Gets together: Not on file     Attends Ep[employer] service: Not on file     Active member of club or organization: Not on file     Attends meetings of clubs or organizations: Not on file     Relationship status: Not on file   Other Topics Concern    Not on file   Social History Narrative    Not on file       MEDICATIONS:   Current Outpatient Medications:     meloxicam (MOBIC) 7.5 MG tablet, Take 1 tablet (7.5 mg total) by mouth 2 (two) times daily with meals., Disp: 30 tablet, Rfl: 1  ALLERGIES: Review of patient's allergies indicates:  No Known Allergies    VITAL SIGNS: /80   Pulse (!) 57   Ht 5' 6" (1.676 m)   Wt 72.6 kg (160 lb)   BMI 25.82 kg/m²        PHYSICAL EXAMINATION    General:  The patient is alert and oriented x 3.  Mood is pleasant.  Observation of ears, eyes and nose reveal no gross abnormalities.  HEENT: NCAT, sclera nonicteric  Lungs: Respirations are equal and unlabored.      Right KNEE EXAMINATION     OBSERVATION / INSPECTION   Gait:   Antalgic "   Alignment:  Neutral   Scars:   None   Muscle atrophy: Mild  Effusion:  None   Warmth:  None   Discoloration:   none     TENDERNESS / CREPITUS (T / C):          T / C      T / C   Patella   - / -   Lateral joint line   - / -   Peripatellar medial  -  Medial joint line    + / -   Peripatellar lateral -  Medial plica   - / -   Patellar tendon -   Popliteal fossa   - / -   Quad tendon   -   Gastrocnemius   -   Prepatellar Bursa - / -   Quadricep   -   Tibial tubercle  -  Thigh/hamstring  -   Pes anserine/HS -  Fibula    -   ITB   - / -  Tibia     -   Tib/fib joint  - / -  LCL    -     MFC   - / -   MCL: Proximal  -    LFC   - / -    Distal   -          ROM: (* = pain)  PASSIVE   ACTIVE    Left :   5 / 0 / 145   5 / 0 / 145     Right :    5 / 0 / 145   5 / 0 / 145    PATELLOFEMORAL EXAMINATION:  See above noted areas of tenderness.   Patella position    Subluxation / dislocation: Centered           Sup. / Inf;   Normal   Crepitus (PF):    Absent   Patellar Mobility:       Medial-lateral:   Normal    Superior-inferior:  Normal    Inferior tilt   Normal    Patellar tendon:  Normal   Lateral tilt:    Normal   J-sign:     None   Patellofemoral grind:   No pain       MENISCAL SIGNS:     Pain on terminal extension:  +  Pain on terminal flexion:  +  Params maneuver:  + for pain  Squat     + posterior joint pain    LIGAMENT EXAMINATION:  ACL / Lachman:  normal (-1 to 2mm)    PCL-Post.  drawer: normal 0 to 2mm  MCL- Valgus:  normal 0 to 2mm  LCL- Varus:  normal 0 to 2mm  Pivot shift: normal (Equal)   Dial Test: difference c/w other side   At 30° flexion: normal (< 5°)    At 90° flexion: normal (< 5°)   Reverse Pivot Shift:   normal (Equal)     STRENGTH: (* = with pain) PAINFUL SIDE   Quadricep   5/5   Hamstrin/5    EXTREMITY NEURO-VASCULAR EXAMINATION:   Sensation:  Grossly intact to light touch all dermatomal regions.   Motor Function:  Fully intact motor function at hip, knee, foot and ankle    DTRs;   quadriceps and  achilles 2+.  No clonus and downgoing Babinski.    Vascular status:  DP and PT pulses 2+, brisk capillary refill, symmetric.     Other Findings:          Xrays: ordered and reviewed personally by me (standing AP/flexion, lateral, sunrise) show: no evidence of arthritis or fracture or dislocation     MRI reviewed personally by me:  Shows Right knee medial meniscal tear -> Root, chondromalacia, medial femoral condyle.     ASSESSMENT:    Right Knee Meniscus tear.      he would benefit from knee arthroscopy, possible plica excision, possible chondroplasty with possible meniscectomy given the above.     PLAN:     Will require clearance by PCP    We have discussed the surgery and recovery of arthroscopic knee surgery. he understands that there may be limited weightbearing up to several weeks after surgery depending on procedures that are performed at the time of surgery.    The spectrum of treatment options were discussed with the patient, including nonoperative and operative options.  After thorough discussion, the patient has elected to undergo surgical treatment to include:  right   a. Knee arthroscopic medial meniscectomy   b. Knee arthroscopic possible plica excision   c. Knee arthroscopic possible chondroplasty    The details of the surgical procedure were explained, including the location of probable incisions and a description of likely hardware and/or grafts to be used.  The patient understands the likely convalescence after surgery.  Also, we have thoroughly discussed the risks, benefits and alternatives to surgery, including, but not limited to, the risk of infection, joint stiffness, blood clot (including DVT and/or pulmonary embolus), neurologic and vascular injury.  It was explained that, if tissue has been repaired or reconstructed, there is a chance of failure, which may require further management.      Surgery scheduled for July 7th, 2020.

## 2020-06-23 DIAGNOSIS — S83.241A ACUTE MEDIAL MENISCUS TEAR OF RIGHT KNEE, INITIAL ENCOUNTER: Primary | ICD-10-CM

## 2020-06-23 DIAGNOSIS — M94.261 CHONDROMALACIA OF RIGHT KNEE: ICD-10-CM

## 2020-06-23 DIAGNOSIS — M67.50 PLICA SYNDROME: ICD-10-CM

## 2020-06-29 ENCOUNTER — OFFICE VISIT (OUTPATIENT)
Dept: SPORTS MEDICINE | Facility: CLINIC | Age: 55
End: 2020-06-29
Payer: OTHER GOVERNMENT

## 2020-06-29 VITALS
HEIGHT: 66 IN | WEIGHT: 160 LBS | SYSTOLIC BLOOD PRESSURE: 127 MMHG | BODY MASS INDEX: 25.71 KG/M2 | HEART RATE: 61 BPM | DIASTOLIC BLOOD PRESSURE: 81 MMHG

## 2020-06-29 DIAGNOSIS — S83.241A ACUTE MEDIAL MENISCUS TEAR OF RIGHT KNEE, INITIAL ENCOUNTER: Primary | ICD-10-CM

## 2020-06-29 DIAGNOSIS — G89.18 POST-OPERATIVE PAIN: ICD-10-CM

## 2020-06-29 PROCEDURE — 99499 UNLISTED E&M SERVICE: CPT | Mod: S$GLB,,, | Performed by: PHYSICIAN ASSISTANT

## 2020-06-29 PROCEDURE — 99999 PR PBB SHADOW E&M-EST. PATIENT-LVL III: ICD-10-PCS | Mod: PBBFAC,,, | Performed by: PHYSICIAN ASSISTANT

## 2020-06-29 PROCEDURE — 99499 NO LOS: ICD-10-PCS | Mod: S$GLB,,, | Performed by: PHYSICIAN ASSISTANT

## 2020-06-29 PROCEDURE — 99999 PR PBB SHADOW E&M-EST. PATIENT-LVL III: CPT | Mod: PBBFAC,,, | Performed by: PHYSICIAN ASSISTANT

## 2020-06-29 RX ORDER — PROMETHAZINE HYDROCHLORIDE 12.5 MG/1
25 TABLET ORAL EVERY 6 HOURS PRN
Qty: 16 TABLET | Refills: 0 | Status: SHIPPED | OUTPATIENT
Start: 2020-06-29

## 2020-06-29 RX ORDER — HYDROCODONE BITARTRATE AND ACETAMINOPHEN 10; 325 MG/1; MG/1
TABLET ORAL
Qty: 12 TABLET | Refills: 0 | Status: SHIPPED | OUTPATIENT
Start: 2020-06-29

## 2020-06-29 RX ORDER — SODIUM CHLORIDE 9 MG/ML
INJECTION, SOLUTION INTRAVENOUS CONTINUOUS
Status: CANCELLED | OUTPATIENT
Start: 2020-06-29

## 2020-06-30 NOTE — H&P
"Oscar Oswald  is here for a completion of his perioperative paperwork. he  Is scheduled to undergo     right              a. Knee arthroscopic medial meniscectomy              b. Knee arthroscopic possible plica excision              c. Knee arthroscopic possible chondroplasty on 7/7/20      He is a healthy individual but does need clearance for this procedure which he  Has received from Dr. Balderas. "No further cardiac work up is indicated prior to proceeding with the surgery. Patient is optimized."    PAST MEDICAL HISTORY: History reviewed. No pertinent past medical history.  PAST SURGICAL HISTORY: History reviewed. No pertinent surgical history.  FAMILY HISTORY: History reviewed. No pertinent family history.  SOCIAL HISTORY:   Social History     Socioeconomic History    Marital status:      Spouse name: Not on file    Number of children: Not on file    Years of education: Not on file    Highest education level: Not on file   Occupational History    Not on file   Social Needs    Financial resource strain: Not on file    Food insecurity     Worry: Not on file     Inability: Not on file    Transportation needs     Medical: Not on file     Non-medical: Not on file   Tobacco Use    Smoking status: Never Smoker    Smokeless tobacco: Never Used   Substance and Sexual Activity    Alcohol use: Not on file    Drug use: Not on file    Sexual activity: Not on file   Lifestyle    Physical activity     Days per week: Not on file     Minutes per session: Not on file    Stress: Not on file   Relationships    Social connections     Talks on phone: Not on file     Gets together: Not on file     Attends Judaism service: Not on file     Active member of club or organization: Not on file     Attends meetings of clubs or organizations: Not on file     Relationship status: Not on file   Other Topics Concern    Not on file   Social History Narrative    Not on file       MEDICATIONS:   Current Outpatient " "Medications:     HYDROcodone-acetaminophen (NORCO)  mg per tablet, Take 1 tablet by mouth every 4-6 hours for pain., Disp: 12 tablet, Rfl: 0    meloxicam (MOBIC) 7.5 MG tablet, Take 1 tablet (7.5 mg total) by mouth 2 (two) times daily with meals., Disp: 30 tablet, Rfl: 1    promethazine (PHENERGAN) 25 MG tablet, Take 1 tablet (25 mg total) by mouth every 6 (six) hours as needed., Disp: 8 tablet, Rfl: 0  ALLERGIES: Review of patient's allergies indicates:  No Known Allergies    VITAL SIGNS: /81   Pulse 61   Ht 5' 6" (1.676 m)   Wt 72.6 kg (160 lb)   BMI 25.82 kg/m²      Risks, indications and benefits of the surgical procedure were discussed with the patient. All questions with regard to surgery, rehab, expected return to functional activities, activities of daily living and recreational endeavors were answered to his satisfaction.    It was explained to the patient that there may be an increase in surgical risks if the patient has certain co-morbidities such as but not limited to: Obesity, Cardiovascular issues (CHF, CAD, Arrhythmias), chronic pulmonary issues, previous or current neurovascular/neurological issues, previous strokes, diabetes mellitus, previous wound healing issues, previous wound or skin infections, PVD, clotting disorders, if the patient uses chronic steroids, if the patient takes or has immune compromising medications or diseases, or has previously or currently used tobacco products.     The patient verbalized that he/she does not have any additional clotting, bleeding, or blood disorders, other than what is list in her chart on today's review.     Then a brief history and physical exam were performed.    Review of Systems   Constitution: Negative. Negative for chills, fever and night sweats.   HENT: Negative for congestion and headaches.    Eyes: Negative for blurred vision, left vision loss and right vision loss.     Musculoskeletal: Negative for falls and muscle weakness. "   Gastrointestinal: Negative for abdominal pain and bowel incontinence.   Genitourinary: Negative for bladder incontinence and nocturia.   Neurological: Negative for disturbances in coordination, loss of balance and seizures.   Psychiatric/Behavioral: Negative for depression. The patient does not have insomnia.    Allergic/Immunologic: Negative for hives and persistent infections.     PHYSICAL EXAM:  GEN: A&Ox3, WD WN NAD  HEENT: WNL  CHEST: CTAB, no W/R/R  HEART: RRR, no M/R/G  ABD: Soft, NT ND, BS x4 QUADS  MS; See Epic  NEURO: CN II-XII intact       The surgical consent was then reviewed with the patient, who agreed with all the contents of the consent form and it was signed. he was then given the surgery packet to bring with him to surgery center for the anesthesia portion of his perioperative paperwork (if needed)   For all physicians except for Dr. Vieyra, we will email and possibly fax the consent forms and booking sheets to [facility].    The patient was given the opportunity to ask questions about the surgical plan and consent form, and once no other questions were asked, I proceeded with the pre-op appointment.    PHYSICAL THERAPY:  He was also instructed regarding physical therapy and will begin on  POD1. He was given a copy of the original prescription to schedule. Another copy of this prescription was also faxed to [facility].    POST OP CARE:instructions were reviewed including care of the wound and dressing after surgery and when he can shower.     CRUTCHES OR WALKER: It was explained to the patient that if they are having a lower extremity surgery that they will require either a walker or crutches to ambulate safely with after surgery. It was explained that a cane or other assistive devices are not sufficient to safely ambulate with after surgery. I explained to the patient that I will place an order for them to receive either crutches or a walker after surgery to go home  with. It was explained that if they have crutches or a walker at home already, that they are REQUIRED to bring them to the hospital on the day of surgery. It was explained that if they do not have them at the hospital on the day of surgery that they WILL be provided a new pair or crutches or a walker to go home with to ensure ambulation will be safe if the patient needs to stop somewhere on the way home.      PAIN MANAGEMENT: Oscar Oswald was also given a pain management regime, which includes the TENS unit given to him by leeroy along with the education required for its use. He was also instructed regarding the Polar ice unit that will be in place after surgery and his postoperative pain medications.     PAIN MEDICATION:  Norco 10/325mg 1 po q 4-6 hours prn pain  Phenergan 25 mg one p.o. q.6 hours p.r.n. nausea and vomiting.    DVT prophylaxis was discussed with the patient today including risk factors for developing DVTs and history of DVTs. The patient was asked if any specific recommendations were given from the doctor/s that did pre-operative surgical clearance. The patient was then given an education sheet about DVTs and PE with warning signs and symptoms of both and steps to take if they suspect either of these.    This along with the Modified Caprini risk assessment model for VTE in general surgical patients was used to determine the patient's DVT risk.     From: Ita BAY, Vern DA, Anastasia SM, et al. Prevention of VTE in nonorthopedic surgical patients: antithrombotic therapy and prevention of thrombosis, 9th ed: American College of Chest Physicians evidence-based clinical practical guidelines. Chest 2012; 141:e227S. Copyright © 2012. Reproduced with permission from the American College of Chest Physicians.    The below listed DVT prophylaxis regimen along with bilateral NEREYDA compression stockings will be used post-op. Length of treatment has been determined to be 10-42 days post-op by the above noted Caprini  assessment model.     The patient was instructed to buy and take:  Aspirin 81mg QD x 4 weeks for DVT prophylaxis starting on the morning after surgery.  Patient will also use bilateral TEDs on lower extremities, SCDs during surgery, and early ambulation post-op. If the patient was previously taking 81mg baby aspirin, they were told to not take it starting 5 days prior to surgery and to restart the 81mg aspirin after surgery.       Patient was also told to buy over the counter Prilosec medication if needed and take it once daily for GI protection as long as they are taking NSAIDs or Aspirin.    Patient denies history of seizures.     The patient was told that narcotic pain medications may make them drowsy and instructions were given to not sign legal documents, drive or operate heavy machinery, cars, or equipment while under the influence of narcotic medications. The patient was told and understands that narcotic pain medications should only be used as needed to control pain and that other options of pain control include TENs unit and ice packs/unit.     As there were no other questions to be asked, he was given my business card along with Carole Winn MD business card if he has any questions or concerns prior to surgery or in the postop period.     As per the guidelines from [facility], this patient's condition is considered time sensitive or an urgent issue. The visit could not be done via telemedicine. The patient meets the current criteria to be evaluate and treated in person at this time. Treatment performed during this visit was medically necessary is to avoid further harm to the patient's underlying condition.

## 2020-07-02 ENCOUNTER — HOSPITAL ENCOUNTER (OUTPATIENT)
Dept: CARDIOLOGY | Facility: CLINIC | Age: 55
Discharge: HOME | End: 2020-07-02
Payer: OTHER GOVERNMENT

## 2020-07-02 ENCOUNTER — INITIAL CONSULT (OUTPATIENT)
Dept: INTERNAL MEDICINE | Facility: CLINIC | Age: 55
End: 2020-07-02
Payer: OTHER GOVERNMENT

## 2020-07-02 VITALS
SYSTOLIC BLOOD PRESSURE: 134 MMHG | HEART RATE: 62 BPM | BODY MASS INDEX: 26.06 KG/M2 | OXYGEN SATURATION: 98 % | WEIGHT: 162.13 LBS | HEIGHT: 66 IN | DIASTOLIC BLOOD PRESSURE: 84 MMHG | TEMPERATURE: 97 F

## 2020-07-02 DIAGNOSIS — Z01.818 PRE-OP EVALUATION: ICD-10-CM

## 2020-07-02 DIAGNOSIS — S83.241A ACUTE MEDIAL MENISCUS TEAR OF RIGHT KNEE, INITIAL ENCOUNTER: ICD-10-CM

## 2020-07-02 DIAGNOSIS — K57.90 DIVERTICULOSIS: ICD-10-CM

## 2020-07-02 PROCEDURE — 93010 EKG 12-LEAD: ICD-10-PCS | Mod: S$PBB,,, | Performed by: INTERNAL MEDICINE

## 2020-07-02 PROCEDURE — 99999 PR PBB SHADOW E&M-EST. PATIENT-LVL IV: ICD-10-PCS | Mod: PBBFAC,,,

## 2020-07-02 PROCEDURE — 99214 OFFICE O/P EST MOD 30 MIN: CPT | Mod: S$GLB,,, | Performed by: HOSPITALIST

## 2020-07-02 PROCEDURE — 93005 ELECTROCARDIOGRAM TRACING: CPT | Mod: PBBFAC | Performed by: INTERNAL MEDICINE

## 2020-07-02 PROCEDURE — 99214 PR OFFICE/OUTPT VISIT, EST, LEVL IV, 30-39 MIN: ICD-10-PCS | Mod: S$GLB,,, | Performed by: HOSPITALIST

## 2020-07-02 PROCEDURE — 99999 PR PBB SHADOW E&M-EST. PATIENT-LVL IV: CPT | Mod: PBBFAC,,,

## 2020-07-02 PROCEDURE — 93010 ELECTROCARDIOGRAM REPORT: CPT | Mod: S$PBB,,, | Performed by: INTERNAL MEDICINE

## 2020-07-02 RX ORDER — ASCORBIC ACID 500 MG
500 TABLET ORAL DAILY
COMMUNITY

## 2020-07-02 RX ORDER — AMOXICILLIN 500 MG
1 CAPSULE ORAL DAILY
COMMUNITY

## 2020-07-02 RX ORDER — ASPIRIN 81 MG/1
81 TABLET ORAL DAILY
COMMUNITY

## 2020-07-02 RX ORDER — MULTIVITAMIN
1 TABLET ORAL DAILY
COMMUNITY

## 2020-07-02 NOTE — Clinical Note
Bryce- how are you?!  I saw Mr. Oswald today in the Pre-op clinic and he is optimized for surgery.  Thank you for the consultation!

## 2020-07-02 NOTE — HPI
This is a 54 y.o. male  who presents today for a preoperative evaluation in preparation for Orthopedic  surgery. Scheduled for 7/7/2020. States  surgery is indicated for torn meniscus in right knee.  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  Details of current problem: The duration of problem is 2 months  Reports symptoms of pain that is relieved by his anti-inflammatory medication  Aggravating Factors include: increased activity, twisting, turning, short stops.  Relieving factors are rest and anti inflammatory meds. Denies pain at this time   The history has been obtained by speaking with the patient and reviewing the electronic medical record and/or outside health information.    Patient reports current health status to be Good.  Denies any new symptoms before surgery.     He lives a one story house with his wife and mother in law.  His wife Cristal will help him after his surgery.

## 2020-07-02 NOTE — PATIENT INSTRUCTIONS
Preventive perioperative care    Thromboembolic prophylaxis:  His risk factors for thrombosis include surgical procedure.I suggest  thromboembolic prophylaxis ( mechanical/pharmacological, weighing the risk benefits of pharmacological agent use considering taniya procedural bleeding )  during the perioperative period.I suggested being active in the post operative period.      Postoperative pulmonary complication prophylaxis-I suggest incentive spirometry use, early ambulation and pain control so as to avoid diaphragmatic splinting       Renal complication prophylaxis- I suggest keeping him well hydrated and avoidance/ minimizing the use of  NSAID's,CALDWELL 2 Inhibitors ,IV contrast if possible in the perioperative period.I suggested drinking 2 litre's of water a day      Surgical site Infection Prophylaxis-I  suggest appropriate antibiotic for Prophylaxis against Surgical site infections       This visit was focused on Preoperative evaluation, Perioperative Medical management, complication reduction plans. I suggest that the patient follows up with primary care or relevant sub specialists for ongoing health care.    I appreciate the opportunity to be involved in this patients care. Please feel free to contact me if there were any questions about this consultation.    Your surgery has been scheduled for:7/7/2020    You should report to:    ____[facility], located on the [place] side of the first floor of the           [facility] ([phone])    ____[facility], located on the [facility] side of the            Second floor of the [facility] ([phone])    ____[facility]    Please Note   - Tell your doctor if you take Aspirin, products containing Aspirin, herbal medications or blood thinners, such as Coumadin, Ticlid, or Plavix.  (Consult your provider regarding holding or stopping before surgery).  - Arrange for someone to  drive you home following surgery.  You will not be allowed to leave the surgical facility alone or drive yourself home following sedation and anesthesia.    Before Surgery  - Stop taking all vitamins/ herbal medications 7 days prior to surgery  - No Motrin/Advil (Ibuprofen) 7 days before surgery  - No Aleve (Naproxen) 7 days before surgery        Stop taking blood thinners__7_days before surgery  - No Goody's/BC  Powder 7 days before surgery  - Refrain from drinking alcoholic beverages for 24 hours before and after surgery  - Stop or limit smoking 14 days before surgery  - You may take Tylenol for pain    Night before Surgery  - Take a shower or bath (shower is recommended).  Bathe with Hibiclens soap or an antibacterial soap from the neck down.  If not supplied by your surgeon, hibiclens soap will need to be purchased over the counter in pharmacy.  Rinse soap off thoroughly.  - Shampoo your hair with your regular shampoo    The Day of Surgery    NOTHING TO  DRINK OR EAT AFTER MIDNIGHT   - Take another bath or shower with hibiclens or any antibacterial soap, to reduce the chance of infection.  - Take heart and blood pressure medications with a small sip of water, as advised by the perioperative team.  - Do not take fluid pills  - You may brush your teeth and rinse your mouth, but do not swallow any additional water.   - Do not apply perfumes, powder, body lotions or deodorant on the day of surgery.  - Nail polish should be removed.  - Do not wear makeup or moisturizer.  - Wear comfortable clothes, such as a button front shirt and loose fitting pants.  - Leave all jewelry, including body piercings, and valuables at home.    - Bring any devices you will neeed after surgery such as crutches or canes.  - If you have sleep apnea, please bring your CPAP machine.    In the event that your physical condition changes including the onset of a cold or respiratory illness, or if you have to delay or cancel your surgery, please  notify your surgeon.  Anesthesia: General Anesthesia     You are watched continuously during your procedure by your anesthesia provider.     Youre due to have surgery. During surgery, youll be given medicine called anesthesia or anesthetic. This will keep you comfortable and pain-free. Your anesthesia provider will use general anesthesia.  What is general anesthesia?  General anesthesia puts you into a state like deep sleep. It goes into the bloodstream (IV anesthetics), into the lungs (gas anesthetics), or both. You feel nothing during the procedure. You will not remember it. During the procedure, the anesthesia provider monitors you continuously. He or she checks your heart rate and rhythm, blood pressure, breathing, and blood oxygen.  · IV anesthetics. IV anesthetics are given through an IV line in your arm. Theyre often given first. This is so you are asleep before a gas anesthetic is started. Some kinds of IV anesthetics relieve pain. Others relax you. Your doctor will decide which kind is best in your case.  · Gas anesthetics. Gas anesthetics are breathed into the lungs. They are often used to keep you asleep. They can be given through a facemask or a tube placed in your larynx or trachea (breathing tube).  ? If you have a facemask, your anesthesia provider will most likely place it over your nose and mouth while youre still awake. Youll breathe oxygen through the mask as your IV anesthetic is started. Gas anesthetic may be added through the mask.  ? If you have a tube in the larynx or trachea, it will be inserted into your throat after youre asleep.  Anesthesia tools and medicines  You will likely have:  · IV anesthetics. These are put into an IV line into your bloodstream.  · Gas anesthetics. You breathe these anesthetics into your lungs, where they pass into your bloodstream.  · Pulse oximeter. This is a small clip that is attached to the end of your finger. This measures your blood oxygen  level.  · Electrocardiography leads (electrodes). These are small sticky pads that are placed on your chest. They record your heart rate and rhythm.  · Blood pressure cuff. This reads your blood pressure.  Risks and possible complications  General anesthesia has some risks. These include:  · Breathing problems  · Nausea and vomiting  · Sore throat or hoarseness (usually temporary)  · Allergic reaction to the anesthetic  · Irregular heartbeat (rare)  · Cardiac arrest (rare)   Anesthesia safety  · Follow all instructions you are given for how long not to eat or drink before your procedure.  · Be sure your doctor knows what medicines and drugs you take. This includes over-the-counter medicines, herbs, supplements, alcohol or other drugs. You will be asked when those were last taken.  · Have an adult family member or friend drive you home after the procedure.  · For the first 24 hours after your surgery:  ? Do not drive or use heavy equipment.  ? Do not make important decisions or sign legal documents. If important decisions or signing legal documents is necessary during the first 24 hours after surgery, have a trusted family member or spouse act on your behalf.  ? Avoid alcohol.  ? Have a responsible adult stay with you. He or she can watch for problems and help keep you safe.  Date Last Reviewed: 12/1/2016  © 8810-0576 [employer]. [address] [place] [address], [place] [address]. All rights reserved. This information is not intended as a substitute for professional medical care. Always follow your healthcare professional's instructions

## 2020-07-02 NOTE — OUTPATIENT SUBJECTIVE & OBJECTIVE
Outpatient Subjective & Objective     Chief complaint-Preoperative evaluation, Perioperative Medical management, complication reduction plan     Active cardiac conditions- none    Revised cardiac risk index predictors- none    Functional capacity -Examples of physical activity walking 4.1 miles per day can take 1 flight of stairs, cutting the grass with a mower, raking lawn, planting shrubs and weeding garden----- He can undertake all the above activities without  chest pain,chest tightness, Shortness of breath ,dizziness,lightheadedness making his exercise tolerance more  than 4 Mets.       Review of Systems   Constitutional: Negative for chills, fatigue and fever.   HENT: Negative for trouble swallowing and voice change.    Eyes:        No acute changes in his vision  Wears readers   Respiratory: Negative for chest tightness, shortness of breath and wheezing.         STOP bang- 2  Low risk   Cardiovascular: Negative for chest pain, palpitations and leg swelling.   Gastrointestinal: Negative for abdominal pain, blood in stool, constipation, diarrhea, nausea and vomiting.        NO FLD cirrhosis hepatitis  No BRB or tarry stool   Genitourinary: Negative for dysuria, frequency and urgency.   Musculoskeletal: Positive for joint swelling. Negative for back pain, neck pain and neck stiffness.   Neurological: Negative for dizziness, seizures, syncope and headaches.   Psychiatric/Behavioral: Negative for suicidal ideas. The patient is not nervous/anxious.          Family History   Problem Relation Age of Onset    Hyperlipidemia Mother     COPD Father      Past Surgical History:   Procedure Laterality Date    COLON SURGERY      colon resection for diverticulitis    deviated septum repair      HERNIA REPAIR      3 hernia repairs in life time    TONSILLECTOMY         No anesthesia, bleeding, cardiac problems with previous surgeries/procedures.  No PE DVT    No known family problems with anesthesia  No bleeding or DVT  "PE    Medications and Allergies reviewed in epic.     Physical Exam   Constitutional: He is oriented to person, place, and time. He is cooperative.   HENT:   Head: Normocephalic.   Nose: Nose normal.   Mouth/Throat: Uvula is midline, oropharynx is clear and moist and mucous membranes are normal. Mucous membranes are moist. Oropharynx is clear.   Eyes: Pupils are equal, round, and reactive to light. Lids are normal. No scleral icterus.   Neck: Normal range of motion. Neck supple. No muscular tenderness present. Carotid bruit is not present. No neck rigidity.   Cardiovascular: Normal rate, regular rhythm and normal heart sounds. Exam reveals no gallop and no friction rub.   No murmur heard.  Pulses:       Carotid pulses are 3+ on the right side and 3+ on the left side.       Radial pulses are 3+ on the right side and 3+ on the left side.        Femoral pulses are 3+ on the right side and 3+ on the left side.       Posterior tibial pulses are 3+ on the right side and 3+ on the left side.   Pulmonary/Chest: Effort normal and breath sounds normal.   Abdominal: Soft. Normal appearance and bowel sounds are normal.   Musculoskeletal:         General: Signs of injury present.      Right lower leg: No edema.      Left lower leg: No edema.   Lymphadenopathy:     He has no cervical adenopathy.        Right cervical: No superficial cervical adenopathy present.       Left cervical: No superficial cervical adenopathy present.   Neurological: He is alert and oriented to person, place, and time. GCS eye subscore is 4. GCS verbal subscore is 5. GCS motor subscore is 6.   Skin: Skin is warm and dry. Capillary refill takes more than 3 seconds.   Psychiatric: His speech is normal and behavior is normal. Mood, memory, judgment and thought content normal.     Blood pressure 134/84, pulse 62, temperature 97.1 °F (36.2 °C), height 5' 6" (1.676 m), weight 73.5 kg (162 lb 1.6 oz), SpO2 98 %.      Investigations  Lab and Imaging have been " reviewed in epic.      Review of old records- Was done and information gathered regards to events leading to surgery and health conditions of significance in the perioperative period.    Outpatient Subjective & Objective

## 2020-07-02 NOTE — ASSESSMENT & PLAN NOTE
No abdominal pain or constipation, lazaro  2015- Had surgery to remove a diverticulitis abcess and later removed 12 inches of his colon

## 2020-07-02 NOTE — PROGRESS NOTES
[facility] - Pre Op Consult  Progress Note    Patient Name: Oscar Oswald  MRN: [id number]  Date of Evaluation- 07/04/2020  PCP- Primary Doctor No    Future cases for Oscar Oswald [[id number]]     Case ID Status Date Time Bhavesh Procedure Provider Location    [id number] [facility] 7/7/2020 11:00 AM 90 ARTHROSCOPY, KNEE, WITH MENISCECTOMY Carole Winn MD [[id number]] [facility]          HPI:  This is a 54 y.o. male  who presents today for a preoperative evaluation in preparation for Orthopedic  surgery. Scheduled for 7/7/2020. States  surgery is indicated for torn meniscus in right knee.  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  Details of current problem: The duration of problem is 2 months  Reports symptoms of pain that is relieved by his anti-inflammatory medication  Aggravating Factors include: increased activity, twisting, turning, short stops.  Relieving factors are rest and anti inflammatory meds. Denies pain at this time   The history has been obtained by speaking with the patient and reviewing the electronic medical record and/or outside health information.    Patient reports current health status to be Good.  Denies any new symptoms before surgery.     He lives a one story house with his wife and mother in law.  His wife Cristal will help him after his surgery.      Subjective/ Objective:       Chief complaint-Preoperative evaluation, Perioperative Medical management, complication reduction plan     Active cardiac conditions- none    Revised cardiac risk index predictors- none    Functional capacity -Examples of physical activity walking 4.1 miles per day can take 1 flight of stairs, cutting the grass with a mower, raking lawn, planting shrubs and weeding garden----- He can undertake all the above activities without  chest pain,chest tightness, Shortness of breath ,dizziness,lightheadedness making his exercise tolerance more  than 4 Mets.       Review of Systems   Constitutional: Negative for chills,  fatigue and fever.   HENT: Negative for trouble swallowing and voice change.    Eyes:        No acute changes in his vision  Wears readers   Respiratory: Negative for chest tightness, shortness of breath and wheezing.         STOP bang- 2  Low risk   Cardiovascular: Negative for chest pain, palpitations and leg swelling.   Gastrointestinal: Negative for abdominal pain, blood in stool, constipation, diarrhea, nausea and vomiting.        NO FLD cirrhosis hepatitis  No BRB or tarry stool   Genitourinary: Negative for dysuria, frequency and urgency.   Musculoskeletal: Positive for joint swelling. Negative for back pain, neck pain and neck stiffness.   Neurological: Negative for dizziness, seizures, syncope and headaches.   Psychiatric/Behavioral: Negative for suicidal ideas. The patient is not nervous/anxious.          Family History   Problem Relation Age of Onset    Hyperlipidemia Mother     COPD Father      Past Surgical History:   Procedure Laterality Date    COLON SURGERY      colon resection for diverticulitis    deviated septum repair      HERNIA REPAIR      3 hernia repairs in life time    TONSILLECTOMY         No anesthesia, bleeding, cardiac problems with previous surgeries/procedures.  No PE DVT    No known family problems with anesthesia  No bleeding or DVT PE    Medications and Allergies reviewed in epic.     Physical Exam   Constitutional: He is oriented to person, place, and time. He is cooperative.   HENT:   Head: Normocephalic.   Nose: Nose normal.   Mouth/Throat: Uvula is midline, oropharynx is clear and moist and mucous membranes are normal. Mucous membranes are moist. Oropharynx is clear.   Eyes: Pupils are equal, round, and reactive to light. Lids are normal. No scleral icterus.   Neck: Normal range of motion. Neck supple. No muscular tenderness present. Carotid bruit is not present. No neck rigidity.   Cardiovascular: Normal rate, regular rhythm and normal heart sounds. Exam reveals no gallop  "and no friction rub.   No murmur heard.  Pulses:       Carotid pulses are 3+ on the right side and 3+ on the left side.       Radial pulses are 3+ on the right side and 3+ on the left side.        Femoral pulses are 3+ on the right side and 3+ on the left side.       Posterior tibial pulses are 3+ on the right side and 3+ on the left side.   Pulmonary/Chest: Effort normal and breath sounds normal.   Abdominal: Soft. Normal appearance and bowel sounds are normal.   Musculoskeletal:         General: Signs of injury present.      Right lower leg: No edema.      Left lower leg: No edema.   Lymphadenopathy:     He has no cervical adenopathy.        Right cervical: No superficial cervical adenopathy present.       Left cervical: No superficial cervical adenopathy present.   Neurological: He is alert and oriented to person, place, and time. GCS eye subscore is 4. GCS verbal subscore is 5. GCS motor subscore is 6.   Skin: Skin is warm and dry. Capillary refill takes more than 3 seconds.   Psychiatric: His speech is normal and behavior is normal. Mood, memory, judgment and thought content normal.     Blood pressure 134/84, pulse 62, temperature 97.1 °F (36.2 °C), height 5' 6" (1.676 m), weight 73.5 kg (162 lb 1.6 oz), SpO2 98 %.      Investigations  Lab and Imaging have been reviewed in epic.      Review of old records- Was done and information gathered regards to events leading to surgery and health conditions of significance in the perioperative period.        Preoperative cardiac risk assessment-  The patient does not have any active cardiac conditions . Revised cardiac risk index predictors- ---.Functional capacity is more than 4 Mets. He will be undergoing a Orthopedic procedure that carries a Moderate Risk risk     The estimated risk of the rate of adverse cardiac outcomes 3.9%    No further cardiac work up is indicated prior to proceeding with the surgery     Orders Placed This Encounter    CBC auto differential    " Protime-INR    Comprehensive metabolic panel    EKG 12-lead       American Society of Anesthesiologists Physical status classification ( ASA ) class:1     Postoperative pulmonary complication risk assessment: 1.6%     ARISCAT ( Canet) risk index- risk class -  Low, if duration of surgery is under 3 hours,    Assessment/Plan:     Acute medial meniscus tear of right knee  meloxicam (MOBIC) 7.5 MG tablet    See HPI    Diverticulosis  No abdominal pain or constipation, diaherra  2015- Had surgery to remove a diverticulitis abcess and later removed 12 inches of his colon        Preventive perioperative care    Thromboembolic prophylaxis:  His risk factors for thrombosis include surgical procedure.I suggest  thromboembolic prophylaxis ( mechanical/pharmacological, weighing the risk benefits of pharmacological agent use considering atniya procedural bleeding )  during the perioperative period.I suggested being active in the post operative period.      Postoperative pulmonary complication prophylaxis-I suggest incentive spirometry use, early ambulation and pain control so as to avoid diaphragmatic splinting       Renal complication prophylaxis- I suggest keeping him well hydrated and avoidance/ minimizing the use of  NSAID's,CALDWELL 2 Inhibitors ,IV contrast if possible in the perioperative period.I suggested drinking 2 litre's of water a day      Surgical site Infection Prophylaxis-I  suggest appropriate antibiotic for Prophylaxis against Surgical site infections       This visit was focused on Preoperative evaluation, Perioperative Medical management, complication reduction plans. I suggest that the patient follows up with primary care or relevant sub specialists for ongoing health care.    I appreciate the opportunity to be involved in this patients care. Please feel free to contact me if there were any questions about this consultation.    Patient is optimized     Labs and EKG nl    Colin Balderas NP  Perioperative  Medicine  [facility]   Pager [phone]

## 2020-07-05 ENCOUNTER — LAB VISIT (OUTPATIENT)
Dept: SPORTS MEDICINE | Facility: CLINIC | Age: 55
End: 2020-07-05
Payer: OTHER GOVERNMENT

## 2020-07-05 DIAGNOSIS — Z01.818 PREOP TESTING: ICD-10-CM

## 2020-07-05 PROCEDURE — U0003 INFECTIOUS AGENT DETECTION BY NUCLEIC ACID (DNA OR RNA); SEVERE ACUTE RESPIRATORY SYNDROME CORONAVIRUS 2 (SARS-COV-2) (CORONAVIRUS DISEASE [COVID-19]), AMPLIFIED PROBE TECHNIQUE, MAKING USE OF HIGH THROUGHPUT TECHNOLOGIES AS DESCRIBED BY CMS-2020-01-R: HCPCS

## 2020-07-06 ENCOUNTER — TELEPHONE (OUTPATIENT)
Dept: SPORTS MEDICINE | Facility: CLINIC | Age: 55
End: 2020-07-06

## 2020-07-06 NOTE — TELEPHONE ENCOUNTER
Medical Condtion/Body Part Injured: Knee  Estimated Date of Onset of Condition or Injury: 3/25/20  Last Day of Work: Currently Working    Employer Name: [employer]  Employee Job Title:   Job Functions/Tasks: Delivering Mail and Parcels    When do you plan to return to work: Still working on Light Duty  Are you trying to return to work: NoRestrictions/Restrictions: Restriction as listed:     How do you want the forms returned: Mail  [address] [place] [id number]    [facility]

## 2020-07-07 ENCOUNTER — TELEPHONE (OUTPATIENT)
Dept: SPORTS MEDICINE | Facility: CLINIC | Age: 55
End: 2020-07-07

## 2020-07-07 DIAGNOSIS — Z01.818 PRE-OP TESTING: Primary | ICD-10-CM

## 2020-07-08 LAB — SARS-COV-2 RNA RESP QL NAA+PROBE: NOT DETECTED

## 2020-07-09 ENCOUNTER — TELEPHONE (OUTPATIENT)
Dept: SPORTS MEDICINE | Facility: CLINIC | Age: 55
End: 2020-07-09

## 2020-07-09 NOTE — TELEPHONE ENCOUNTER
----- Message from Amelia Smallwood sent at 7/9/2020  3:45 PM CDT -----  Regarding: self  Pt was returning your call. Asking for a call back.        Contact info [phone]

## 2020-07-09 NOTE — TELEPHONE ENCOUNTER
----- Message from Amelia Smallwood sent at 7/9/2020 11:40 AM CDT -----  Regarding: self  Pt calling about his surgery stating he is cleared from pcp.  Asking for a call back.    Contact info [phone]

## 2020-07-09 NOTE — TELEPHONE ENCOUNTER
Spoke to patient and let him know we rescheduled his surgery date to 7/28/20 and will need to redo his Covid test.

## 2020-07-10 ENCOUNTER — TELEPHONE (OUTPATIENT)
Dept: SPORTS MEDICINE | Facility: CLINIC | Age: 55
End: 2020-07-10

## 2020-07-10 NOTE — TELEPHONE ENCOUNTER
----- Message from Amelia Smallwood sent at 7/10/2020  9:13 AM CDT -----  Regarding: self  Pt is returning your call.    Asking for  celia back.    Contact info [phone]

## 2020-07-10 NOTE — TELEPHONE ENCOUNTER
Spoke to patient and he informed me that his  send in approval for his surgery. I explained to patient that as soon as our w/c department updates his status I will let him know.

## 2020-07-25 ENCOUNTER — LAB VISIT (OUTPATIENT)
Dept: SPORTS MEDICINE | Facility: CLINIC | Age: 55
End: 2020-07-25
Payer: OTHER GOVERNMENT

## 2020-07-25 DIAGNOSIS — Z01.818 PRE-OP TESTING: ICD-10-CM

## 2020-07-25 PROCEDURE — U0003 INFECTIOUS AGENT DETECTION BY NUCLEIC ACID (DNA OR RNA); SEVERE ACUTE RESPIRATORY SYNDROME CORONAVIRUS 2 (SARS-COV-2) (CORONAVIRUS DISEASE [COVID-19]), AMPLIFIED PROBE TECHNIQUE, MAKING USE OF HIGH THROUGHPUT TECHNOLOGIES AS DESCRIBED BY CMS-2020-01-R: HCPCS

## 2020-07-26 LAB — SARS-COV-2 RNA RESP QL NAA+PROBE: NOT DETECTED

## 2020-07-28 ENCOUNTER — HOSPITAL ENCOUNTER (OUTPATIENT)
Facility: HOSPITAL | Age: 55
Discharge: HOME | End: 2020-07-28
Attending: ORTHOPAEDIC SURGERY | Admitting: ORTHOPAEDIC SURGERY
Payer: OTHER GOVERNMENT

## 2020-07-28 ENCOUNTER — ANESTHESIA EVENT (OUTPATIENT)
Dept: SURGERY | Facility: HOSPITAL | Age: 55
End: 2020-07-28
Payer: OTHER GOVERNMENT

## 2020-07-28 ENCOUNTER — ANESTHESIA (OUTPATIENT)
Dept: SURGERY | Facility: HOSPITAL | Age: 55
End: 2020-07-28
Payer: OTHER GOVERNMENT

## 2020-07-28 VITALS
WEIGHT: 160 LBS | BODY MASS INDEX: 25.71 KG/M2 | DIASTOLIC BLOOD PRESSURE: 84 MMHG | HEART RATE: 51 BPM | OXYGEN SATURATION: 95 % | RESPIRATION RATE: 20 BRPM | SYSTOLIC BLOOD PRESSURE: 148 MMHG | HEIGHT: 66 IN | TEMPERATURE: 97 F

## 2020-07-28 DIAGNOSIS — S83.241A ACUTE MEDIAL MENISCUS TEAR OF RIGHT KNEE, INITIAL ENCOUNTER: ICD-10-CM

## 2020-07-28 PROCEDURE — 25000003 PHARM REV CODE 250: Performed by: ORTHOPAEDIC SURGERY

## 2020-07-28 PROCEDURE — 63600175 PHARM REV CODE 636 W HCPCS: Performed by: PHYSICIAN ASSISTANT

## 2020-07-28 PROCEDURE — 37000008 HC ANESTHESIA 1ST 15 MINUTES: Performed by: ORTHOPAEDIC SURGERY

## 2020-07-28 PROCEDURE — 37000009 HC ANESTHESIA EA ADD 15 MINS: Performed by: ORTHOPAEDIC SURGERY

## 2020-07-28 PROCEDURE — 63600175 PHARM REV CODE 636 W HCPCS: Performed by: NURSE ANESTHETIST, CERTIFIED REGISTERED

## 2020-07-28 PROCEDURE — 99900035 HC TECH TIME PER 15 MIN (STAT)

## 2020-07-28 PROCEDURE — D9220A PRA ANESTHESIA: ICD-10-PCS | Mod: ,,, | Performed by: ANESTHESIOLOGY

## 2020-07-28 PROCEDURE — 25000003 PHARM REV CODE 250: Performed by: NURSE ANESTHETIST, CERTIFIED REGISTERED

## 2020-07-28 PROCEDURE — 29880 PR KNEE SCOPE MED/LAT MENISCECTOMY: ICD-10-PCS | Mod: RT,,, | Performed by: ORTHOPAEDIC SURGERY

## 2020-07-28 PROCEDURE — D9220A PRA ANESTHESIA: Mod: ,,, | Performed by: ANESTHESIOLOGY

## 2020-07-28 PROCEDURE — 36000710: Performed by: ORTHOPAEDIC SURGERY

## 2020-07-28 PROCEDURE — 27201423 OPTIME MED/SURG SUP & DEVICES STERILE SUPPLY: Performed by: ORTHOPAEDIC SURGERY

## 2020-07-28 PROCEDURE — 71000033 HC RECOVERY, INTIAL HOUR: Performed by: ORTHOPAEDIC SURGERY

## 2020-07-28 PROCEDURE — 25000003 PHARM REV CODE 250: Performed by: PHYSICIAN ASSISTANT

## 2020-07-28 PROCEDURE — 94761 N-INVAS EAR/PLS OXIMETRY MLT: CPT

## 2020-07-28 PROCEDURE — 63600175 PHARM REV CODE 636 W HCPCS: Performed by: ORTHOPAEDIC SURGERY

## 2020-07-28 PROCEDURE — 36000711: Performed by: ORTHOPAEDIC SURGERY

## 2020-07-28 PROCEDURE — 29880 ARTHRS KNE SRG MNISECTMY M&L: CPT | Mod: RT,,, | Performed by: ORTHOPAEDIC SURGERY

## 2020-07-28 PROCEDURE — 71000015 HC POSTOP RECOV 1ST HR: Performed by: ORTHOPAEDIC SURGERY

## 2020-07-28 PROCEDURE — S0028 INJECTION, FAMOTIDINE, 20 MG: HCPCS | Performed by: NURSE ANESTHETIST, CERTIFIED REGISTERED

## 2020-07-28 RX ORDER — MIDAZOLAM HYDROCHLORIDE 1 MG/ML
INJECTION INTRAMUSCULAR; INTRAVENOUS
Status: DISCONTINUED | OUTPATIENT
Start: 2020-07-28 | End: 2020-07-28

## 2020-07-28 RX ORDER — LIDOCAINE HCL/PF 100 MG/5ML
SYRINGE (ML) INTRAVENOUS
Status: DISCONTINUED | OUTPATIENT
Start: 2020-07-28 | End: 2020-07-28

## 2020-07-28 RX ORDER — ACETAMINOPHEN 500 MG
1000 TABLET ORAL EVERY 8 HOURS
Status: DISCONTINUED | OUTPATIENT
Start: 2020-07-28 | End: 2020-07-28 | Stop reason: HOSPADM

## 2020-07-28 RX ORDER — OXYCODONE HYDROCHLORIDE 5 MG/1
10 TABLET ORAL EVERY 4 HOURS PRN
Status: DISCONTINUED | OUTPATIENT
Start: 2020-07-28 | End: 2020-07-28 | Stop reason: HOSPADM

## 2020-07-28 RX ORDER — ROPIVACAINE HYDROCHLORIDE 5 MG/ML
INJECTION, SOLUTION EPIDURAL; INFILTRATION; PERINEURAL
Status: DISCONTINUED | OUTPATIENT
Start: 2020-07-28 | End: 2020-07-28 | Stop reason: HOSPADM

## 2020-07-28 RX ORDER — ONDANSETRON 2 MG/ML
4 INJECTION INTRAMUSCULAR; INTRAVENOUS DAILY PRN
Status: DISCONTINUED | OUTPATIENT
Start: 2020-07-28 | End: 2020-07-28 | Stop reason: HOSPADM

## 2020-07-28 RX ORDER — EPINEPHRINE 1 MG/ML
INJECTION, SOLUTION INTRACARDIAC; INTRAMUSCULAR; INTRAVENOUS; SUBCUTANEOUS
Status: DISCONTINUED | OUTPATIENT
Start: 2020-07-28 | End: 2020-07-28 | Stop reason: HOSPADM

## 2020-07-28 RX ORDER — OXYCODONE HYDROCHLORIDE 5 MG/1
5 TABLET ORAL EVERY 4 HOURS PRN
Status: DISCONTINUED | OUTPATIENT
Start: 2020-07-28 | End: 2020-07-28 | Stop reason: HOSPADM

## 2020-07-28 RX ORDER — KETAMINE HYDROCHLORIDE 100 MG/ML
INJECTION, SOLUTION INTRAMUSCULAR; INTRAVENOUS
Status: DISCONTINUED | OUTPATIENT
Start: 2020-07-28 | End: 2020-07-28 | Stop reason: HOSPADM

## 2020-07-28 RX ORDER — FENTANYL CITRATE 50 UG/ML
25 INJECTION, SOLUTION INTRAMUSCULAR; INTRAVENOUS EVERY 5 MIN PRN
Status: DISCONTINUED | OUTPATIENT
Start: 2020-07-28 | End: 2020-07-28 | Stop reason: HOSPADM

## 2020-07-28 RX ORDER — FAMOTIDINE 10 MG/ML
INJECTION INTRAVENOUS
Status: DISCONTINUED | OUTPATIENT
Start: 2020-07-28 | End: 2020-07-28

## 2020-07-28 RX ORDER — CEFAZOLIN SODIUM 1 G/3ML
2 INJECTION, POWDER, FOR SOLUTION INTRAMUSCULAR; INTRAVENOUS
Status: COMPLETED | OUTPATIENT
Start: 2020-07-28 | End: 2020-07-28

## 2020-07-28 RX ORDER — ONDANSETRON HYDROCHLORIDE 2 MG/ML
INJECTION, SOLUTION INTRAMUSCULAR; INTRAVENOUS
Status: DISCONTINUED | OUTPATIENT
Start: 2020-07-28 | End: 2020-07-28

## 2020-07-28 RX ORDER — TRAMADOL HYDROCHLORIDE 50 MG/1
50 TABLET ORAL EVERY 4 HOURS PRN
Status: DISCONTINUED | OUTPATIENT
Start: 2020-07-28 | End: 2020-07-28 | Stop reason: HOSPADM

## 2020-07-28 RX ORDER — DEXAMETHASONE SODIUM PHOSPHATE 4 MG/ML
INJECTION, SOLUTION INTRA-ARTICULAR; INTRALESIONAL; INTRAMUSCULAR; INTRAVENOUS; SOFT TISSUE
Status: DISCONTINUED | OUTPATIENT
Start: 2020-07-28 | End: 2020-07-28

## 2020-07-28 RX ORDER — KETOROLAC TROMETHAMINE 30 MG/ML
INJECTION, SOLUTION INTRAMUSCULAR; INTRAVENOUS
Status: DISCONTINUED | OUTPATIENT
Start: 2020-07-28 | End: 2020-07-28 | Stop reason: HOSPADM

## 2020-07-28 RX ORDER — SODIUM CHLORIDE 0.9 % (FLUSH) 0.9 %
10 SYRINGE (ML) INJECTION
Status: DISCONTINUED | OUTPATIENT
Start: 2020-07-28 | End: 2020-07-28 | Stop reason: HOSPADM

## 2020-07-28 RX ORDER — SODIUM CHLORIDE 9 MG/ML
INJECTION, SOLUTION INTRAVENOUS CONTINUOUS
Status: DISCONTINUED | OUTPATIENT
Start: 2020-07-28 | End: 2020-07-28 | Stop reason: HOSPADM

## 2020-07-28 RX ORDER — METOCLOPRAMIDE HYDROCHLORIDE 5 MG/ML
5 INJECTION INTRAMUSCULAR; INTRAVENOUS EVERY 6 HOURS PRN
Status: DISCONTINUED | OUTPATIENT
Start: 2020-07-28 | End: 2020-07-28 | Stop reason: HOSPADM

## 2020-07-28 RX ORDER — ONDANSETRON 4 MG/1
8 TABLET, ORALLY DISINTEGRATING ORAL EVERY 8 HOURS PRN
Status: DISCONTINUED | OUTPATIENT
Start: 2020-07-28 | End: 2020-07-28 | Stop reason: HOSPADM

## 2020-07-28 RX ORDER — FENTANYL CITRATE 50 UG/ML
INJECTION, SOLUTION INTRAMUSCULAR; INTRAVENOUS
Status: DISCONTINUED | OUTPATIENT
Start: 2020-07-28 | End: 2020-07-28

## 2020-07-28 RX ORDER — OXYCODONE HYDROCHLORIDE 5 MG/1
5 TABLET ORAL
Status: DISCONTINUED | OUTPATIENT
Start: 2020-07-28 | End: 2020-07-28 | Stop reason: HOSPADM

## 2020-07-28 RX ORDER — PROPOFOL 10 MG/ML
VIAL (ML) INTRAVENOUS
Status: DISCONTINUED | OUTPATIENT
Start: 2020-07-28 | End: 2020-07-28

## 2020-07-28 RX ADMIN — CEFAZOLIN 2 G: 330 INJECTION, POWDER, FOR SOLUTION INTRAMUSCULAR; INTRAVENOUS at 12:07

## 2020-07-28 RX ADMIN — ONDANSETRON 4 MG: 2 INJECTION, SOLUTION INTRAMUSCULAR; INTRAVENOUS at 01:07

## 2020-07-28 RX ADMIN — FENTANYL CITRATE 50 MCG: 50 INJECTION, SOLUTION INTRAMUSCULAR; INTRAVENOUS at 01:07

## 2020-07-28 RX ADMIN — DEXAMETHASONE SODIUM PHOSPHATE 8 MG: 4 INJECTION, SOLUTION INTRAMUSCULAR; INTRAVENOUS at 12:07

## 2020-07-28 RX ADMIN — MIDAZOLAM HYDROCHLORIDE 2 MG: 1 INJECTION, SOLUTION INTRAMUSCULAR; INTRAVENOUS at 12:07

## 2020-07-28 RX ADMIN — FENTANYL CITRATE 50 MCG: 50 INJECTION, SOLUTION INTRAMUSCULAR; INTRAVENOUS at 12:07

## 2020-07-28 RX ADMIN — LIDOCAINE HYDROCHLORIDE 100 MG: 20 INJECTION, SOLUTION INTRAVENOUS at 12:07

## 2020-07-28 RX ADMIN — PROPOFOL 200 MG: 10 INJECTION, EMULSION INTRAVENOUS at 12:07

## 2020-07-28 RX ADMIN — FAMOTIDINE 20 MG: 10 INJECTION, SOLUTION INTRAVENOUS at 12:07

## 2020-07-28 RX ADMIN — SODIUM CHLORIDE: 0.9 INJECTION, SOLUTION INTRAVENOUS at 09:07

## 2020-07-28 RX ADMIN — SODIUM CHLORIDE, SODIUM GLUCONATE, SODIUM ACETATE, POTASSIUM CHLORIDE, MAGNESIUM CHLORIDE, SODIUM PHOSPHATE, DIBASIC, AND POTASSIUM PHOSPHATE: .53; .5; .37; .037; .03; .012; .00082 INJECTION, SOLUTION INTRAVENOUS at 12:07

## 2020-07-28 NOTE — TRANSFER OF CARE
"Anesthesia Transfer of Care Note    Patient: Oscar Oswald    Procedure(s) Performed: Procedure(s) (LRB):  ARTHROSCOPY, KNEE, WITH MENISCECTOMY (Right)  SYNOVECTOMY, KNEE (Right)  CHONDROPLASTY, KNEE (Right)  DEBRIDEMENT, KNEE (Right)    Patient location: PACU    Anesthesia Type: general    Transport from OR: Transported from OR on 6-10 L/min O2 by face mask with adequate spontaneous ventilation    Post pain: adequate analgesia    Post assessment: no apparent anesthetic complications    Post vital signs: stable    Level of consciousness: sedated    Nausea/Vomiting: no nausea/vomiting    Complications: none    Transfer of care protocol was followed      Last vitals:   Visit Vitals  /72   Pulse 61   Temp 36.1 °C (97 °F)   Resp 16   Ht 5' 6" (1.676 m)   Wt 72.6 kg (160 lb)   SpO2 (!) 90%   BMI 25.82 kg/m²     "

## 2020-07-28 NOTE — ANESTHESIA PROCEDURE NOTES
Intubation  Performed by: Tomasa Ramires CRNA  Authorized by: Melvin Carballo MD     Intubation:     Induction:  Intravenous    Intubated:  Postinduction    Mask Ventilation:  Easy mask    Attempts:  1    Attempted By:  Staff anesthesiologist    Difficult Airway Encountered?: No      Complications:  None    Airway Device:  Supraglottic airway/LMA    Airway Device Size:  3.5    Style/Cuff Inflation:  Cuffed    Secured at:  The lips    Placement Verified By:  Capnometry    Complicating Factors:  None    Findings Post-Intubation:  BS equal bilateral

## 2020-07-28 NOTE — ANESTHESIA POSTPROCEDURE EVALUATION
Anesthesia Post Evaluation    Patient: Oscar Oswald    Procedure(s) Performed: Procedure(s) (LRB):  ARTHROSCOPY, KNEE, WITH MEDIAL AND LATERAL MENISCECTOMY (Right)  PARTIAL SYNOVECTOMY, KNEE (Right)  CHONDROPLASTY, KNEE (Right)  DEBRIDEMENT, KNEE (Right)  EXCISION, PLICA, KNEE, ARTHROSCOPIC (Right)  [id number] (Right)    Final Anesthesia Type: general    Patient location during evaluation: PACU  Patient participation: Yes- Able to Participate  Level of consciousness: awake and alert and oriented  Post-procedure vital signs: reviewed and stable  Pain management: adequate  Airway patency: patent    PONV status at discharge: No PONV  Anesthetic complications: no      Cardiovascular status: blood pressure returned to baseline and hemodynamically stable  Respiratory status: unassisted, spontaneous ventilation and room air  Hydration status: euvolemic  Follow-up not needed.          Vitals Value Taken Time   /78 07/28/20 1417   Temp  07/28/20 1431   Pulse 54 07/28/20 1431   Resp 17 07/28/20 1431   SpO2 94 % 07/28/20 1431   Vitals shown include unvalidated device data.      No case tracking events are documented in the log.      Pain/Jose Score: No data recorded

## 2020-07-28 NOTE — ANESTHESIA PREPROCEDURE EVALUATION
07/28/2020  Oscar Oswald is a 54 y.o., male     Anesthesia Evaluation    I have reviewed the Patient Summary Reports.    I have reviewed the Nursing Notes. I have reviewed the NPO Status.      Review of Systems  Anesthesia Hx:  No problems with previous Anesthesia    Social:  Non-Smoker, No Alcohol Use    Hematology/Oncology:  Hematology Normal   Oncology Normal     EENT/Dental:EENT/Dental Normal   Cardiovascular:  Cardiovascular Normal     Pulmonary:  Pulmonary Normal    Renal/:  Renal/ Normal     Hepatic/GI:  Hepatic/GI Normal    Musculoskeletal:  Musculoskeletal Normal    Neurological:  Neurology Normal    Dermatological:  Skin Normal    Psych:  Psychiatric Normal           Physical Exam  General:  Well nourished    Airway/Jaw/Neck:  Airway Findings: Mouth Opening: Normal Tongue: Normal  General Airway Assessment: Adult  Mallampati: II  Improves to I with phonation.  TM Distance: Normal, at least 6 cm  Jaw/Neck Findings:     Neck ROM: Normal ROM      Dental:  Dental Findings: In tact   Chest/Lungs:  Chest/Lungs Findings: Clear to auscultation, Normal Respiratory Rate     Heart/Vascular:  Heart Findings: Rate: Normal  Rhythm: Regular Rhythm  Sounds: Normal        Mental Status:  Mental Status Findings:  Cooperative, Alert and Oriented         Anesthesia Plan  Type of Anesthesia, risks & benefits discussed:  Anesthesia Type:  general  Patient's Preference:   Intra-op Monitoring Plan: standard ASA monitors  Intra-op Monitoring Plan Comments:   Post Op Pain Control Plan: per primary service following discharge from PACU, IV/PO Opioids PRN and multimodal analgesia  Post Op Pain Control Plan Comments:   Induction:   IV  Beta Blocker:  Patient is not currently on a Beta-Blocker (No further documentation required).       Informed Consent: Patient understands risks and agrees with Anesthesia plan.  Questions  answered. Anesthesia consent signed with patient.  ASA Score: 1     Day of Surgery Review of History & Physical: I have interviewed and examined the patient. I have reviewed the patient's H&P dated:            Ready For Surgery From Anesthesia Perspective.

## 2020-07-29 ENCOUNTER — CLINICAL SUPPORT (OUTPATIENT)
Dept: REHABILITATION | Facility: HOSPITAL | Age: 55
End: 2020-07-29
Payer: OTHER GOVERNMENT

## 2020-07-29 DIAGNOSIS — R29.898 WEAKNESS OF RIGHT LOWER EXTREMITY: ICD-10-CM

## 2020-07-29 DIAGNOSIS — S83.241A ACUTE MEDIAL MENISCUS TEAR OF RIGHT KNEE, INITIAL ENCOUNTER: ICD-10-CM

## 2020-07-29 DIAGNOSIS — R26.9 GAIT ABNORMALITY: ICD-10-CM

## 2020-07-29 PROCEDURE — 97110 THERAPEUTIC EXERCISES: CPT

## 2020-07-29 PROCEDURE — 97161 PT EVAL LOW COMPLEX 20 MIN: CPT

## 2020-07-29 NOTE — PLAN OF CARE
[facility]  Physical Therapy Initial Evaluation    Date: 7/29/2020   Name: Oscar Oswald  Clinic Number: [id number]    Therapy Diagnosis:   Encounter Diagnoses   Name Primary?    Acute medial meniscus tear of right knee, initial encounter     Weakness of right lower extremity     Gait abnormality      Physician: Davi Lennon III, *    Physician Orders: PT Eval and Treat   Medical Diagnosis from Referral: S83.241A (ICD-10-CM) - Acute medial meniscus tear of right knee, initial encounter  Evaluation Date: 7/29/2020  Authorization Period Expiration: 6/29/2021  Plan of Care Expiration: 9/25/2020  Visit # / Visits authorized: 1/ 24    Time In: 1:15 pm  Time Out: 1:45 pm  Total Appointment Time (timed & untimed codes): 30 minutes    Precautions: Standard   7/28/2020: Partial meniscectomy    Subjective   Date of onset: surgery 7/28/2020  History of current condition - Oscar reports: he stepped off somebody's yard at work and tore his meniscus March 25th. He saw Dr. Winn. The more he walked, the more his knee would swell and had to go down to half-duty. He had a meniscectomy and is feeling good since. He has not had to take any pain medications. He had a hernia surgery right after Mardi Fredy.      Medical History:   No past medical history on file.    Surgical History:   Oscar Oswald  has a past surgical history that includes Hernia repair; Colon surgery; deviated septum repair; Tonsillectomy; Knee arthroscopy w/ meniscectomy (Right, 7/28/2020); Synovectomy of knee (Right, 7/28/2020); Chondroplasty of knee (Right, 7/28/2020); Knee debridement (Right, 7/28/2020); and Knee arthroscopy w/ plica excision (Right, 7/28/2020).    Medications:   Oscar has a current medication list which includes the following prescription(s): ascorbic acid (vitamin c), aspirin, hydrocodone-acetaminophen, meloxicam, multivitamin, fish oil-omega-3 fatty acids, and promethazine.    Allergies:   Review of  patient's allergies indicates:  No Known Allergies     Imaging, MRI studies:   1. Full-thickness radial tear of posterior root medial meniscus.  2. Patellofemoral and medial tibiofemoral cartilage loss.  3. Small joint effusion with partially ruptured small Baker's cyst.    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)  Prior Level of Function: independent with ADLs, had to be half duty prior to surgery  Current Level of Function: unable to work    Pain:  Current 0/10, worst 0/10, best 0/10   Location: right knee  Description: N/A  Aggravating Factors: none  Easing Factors: N/A    Patients goals: return to yardwork, golf, work    Objective     Observation: ambulating without AD, NEREYDA hose and dressings on    Posture: unremarkable      Range of Motion:   Knee Left active Left Passive Right Active R passive   Flexion 135 140 110 110   Extension 5 10 5 5       Lower Extremity Strength  Not indicated due to post-op status    Function:    - SLS R: 5 sec  - SLS L: 30 sec  - Squat: anterior weight shift, bilateral foot abduction   - Sit <--> Stand:I   - Bed Mobility: I      Joint Mobility: decreased patellar and fat pad mobility    Palpation: no tenderness to palpation    Sensation: no sensation deficits    Edema: minimal visible edema      Limitation/Restriction for FOTO Knee Survey    Therapist reviewed FOTO scores for Oscar Oswald on 7/29/2020.   FOTO documents entered into [employer] - see Media section.    Limitation Score: na%         TREATMENT   Treatment Time In: 1:30 pm  Treatment Time Out: 1:45 pm  Total Treatment time (time-based codes) separate from Evaluation: 15 minutes    Oscar received therapeutic exercises to develop strength, endurance, ROM and flexibility for 15 minutes including:  SLR x15  Heel slide x10   Squat retraining x10    Home Exercises  and Patient Education Provided    Education provided:   - exercise progression, walking progression; prognosis    Written Home Exercises Provided: yes.  Exercises were reviewed and Oscar was able to demonstrate them prior to the end of the session.  Oscar demonstrated good  understanding of the education provided.     See EMR under Patient Instructions for exercises provided 7/29/2020.    Assessment   Oscar is a 54 y.o. male referred to outpatient Physical Therapy with a medical diagnosis of medial meniscus tear of right knee. Patient presents with decreased knee range of motion, LE strength, patellar stiffness, gait abnormality, and decreased tolerance for functional mobility. He would benefit from skilled PT services to improve strength and functional mobility to return to his prior level of function.     Patient prognosis is Excellent.   Patientt will benefit from skilled outpatient Physical Therapy to address the deficits stated above and in the chart below, provide patient /family education, and to maximize patientt's level of independence.     Plan of care discussed with patient: Yes  Patient's spiritual, cultural and educational needs considered and patient is agreeable to the plan of care and goals as stated below:     Anticipated Barriers for therapy: none anticipated    Medical Necessity is demonstrated by the following  History  Co-morbidities and personal factors that may impact the plan of care Co-morbidities:   none    Personal Factors:   no deficits     low   Examination  Body Structures and Functions, activity limitations and participation restrictions that may impact the plan of care Body Regions:   lower extremities    Body Systems:    gross symmetry  ROM  strength  gross coordinated movement  balance  gait  transfers  transitions  motor control  motor learning  edema    Participation Restrictions:   Unable to work, golf, do yardwork    Activity limitations:   Learning and applying knowledge  no  deficits    General Tasks and Commands  no deficits    Communication  no deficits    Mobility  lifting and carrying objects  walking    Self care  washing oneself (bathing, drying, washing hands)  caring for body parts (brushing teeth, shaving, grooming)    Domestic Life  shopping  cooking  doing house work (cleaning house, washing dishes, laundry)    Interactions/Relationships  family relationships    Life Areas  employment    Community and Social Life  community life  recreation and leisure         moderate   Clinical Presentation evolving clinical presentation with changing clinical characteristics moderate   Decision Making/ Complexity Score: low     Goals:  Short Term Goals: 4 weeks   - amb 2 miles on outdoors without pain provocation or need for rest  - do SLS >30 sec without LOB or pain  - do anterior step down of 6  without pain provocation  - demonstrate no measureable edema for proper healing    Long Term Goals: 8 weeks   - pt will be able to return to golf for return to prior level of function  - pt will be able to perform full work duties without increase in pain for functional improvement  - pt will demonstrate appropriate single leg control with unilateral activities for improved strength    Plan   Plan of care Certification: 7/29/2020 to 9/25/2020.    Outpatient Physical Therapy 2 times weekly for 8 weeks to include the following interventions: Gait Training, Manual Therapy, Moist Heat/ Ice, Neuromuscular Re-ed, Patient Education, Therapeutic Activites and Therapeutic Exercise.     Constanza Bae, PT

## 2020-08-05 ENCOUNTER — CLINICAL SUPPORT (OUTPATIENT)
Dept: REHABILITATION | Facility: HOSPITAL | Age: 55
End: 2020-08-05
Payer: OTHER GOVERNMENT

## 2020-08-05 DIAGNOSIS — R26.9 GAIT ABNORMALITY: ICD-10-CM

## 2020-08-05 DIAGNOSIS — R29.898 WEAKNESS OF RIGHT LOWER EXTREMITY: ICD-10-CM

## 2020-08-05 PROCEDURE — 97112 NEUROMUSCULAR REEDUCATION: CPT

## 2020-08-05 PROCEDURE — 97140 MANUAL THERAPY 1/> REGIONS: CPT

## 2020-08-05 PROCEDURE — 97110 THERAPEUTIC EXERCISES: CPT

## 2020-08-05 NOTE — PROGRESS NOTES
"  Physical Therapy Daily Treatment Note     Name: Oscar Oswald  Clinic Number: [id number]    Therapy Diagnosis:   Encounter Diagnoses   Name Primary?    Weakness of right lower extremity     Gait abnormality      Physician: Davi Lennon III, *    Visit Date: 8/5/2020  Physician Orders: PT Eval and Treat   Medical Diagnosis from Referral: S83.241A (ICD-10-CM) - Acute medial meniscus tear of right knee, initial encounter  Evaluation Date: 7/29/2020  Authorization Period Expiration: 6/29/2021  Plan of Care Expiration: 9/25/2020  Visit # / Visits authorized: 2/ 24    Time In: 4:15 pm  Time Out: 5:00 pm  Total Billable Time: 45 minutes    Precautions: Standard   7/28/2020: Partial meniscectomy    Subjective     Pt reports: he feels a little stiff and swollen. He has been doing his exercises and is going on walks every day. He swung his golf club the other day. PT educated him against this due to need to limit rotational forces to protect the meniscus right now.   He was compliant with home exercise program.  Response to previous treatment: no adverse effects  Functional change: none yet    Pain: 0/10  Location: right knee      Objective     Oscar received therapeutic exercises to develop strength, endurance, ROM and flexibility for 25 minutes including:  Upright bike x10 min for improved mobility, circulation, and muscular endurance  LAQ 20x5" 2#  Shuttle squats 3x10 62.5#    Oscar received the following manual therapy techniques: Joint mobilizations were applied to the: R knee for 10 minutes, including:  Patellar mobilizations in all directions  Fat pad mobilizations  Knee extension hinge mobilizations    Oscar participated in neuromuscular re-education activities to improve: Balance, Coordination, Kinesthetic, Sense and Proprioception for 10 minutes. The following activities were included:  SAQ with AAROM and eccentric lowering 30x5"  Retro walking pink sportcord x3 laps    Oscar participated in dynamic " functional therapeutic activities to improve functional performance for 00  minutes, including:      Home Exercises Provided and Patient Education Provided     Education provided:   - pathophysiology, expectations, activity modification    Written Home Exercises Provided: Patient instructed to cont prior HEP.  Exercises were reviewed and Oscar was able to demonstrate them prior to the end of the session.  Oscar demonstrated good  understanding of the education provided.     See EMR under Patient Instructions for exercises provided 8/5/2020.    Assessment     Pt without terminal knee extension upon entrance. Able to reach knee hyperextension passively but not actively. Increased focus on quad activation to work on this. Improved gait pattern upon exiting session.    Oscar is progressing well towards his goals.   Pt prognosis is Excellent.     Pt will continue to benefit from skilled outpatient physical therapy to address the deficits listed in the problem list box on initial evaluation, provide pt/family education and to maximize pt's level of independence in the home and community environment.     Pt's spiritual, cultural and educational needs considered and pt agreeable to plan of care and goals.    Anticipated barriers to physical therapy: none anticipated     Goals:   Short Term Goals: 4 weeks   - amb 2 miles on outdoors without pain provocation or need for rest  - do SLS >30 sec without LOB or pain  - do anterior step down of 6  without pain provocation  - demonstrate no measureable edema for proper healing     Long Term Goals: 8 weeks   - pt will be able to return to golf for return to prior level of function  - pt will be able to perform full work duties without increase in pain for functional improvement  - pt will demonstrate appropriate single leg control with unilateral activities for improved strength    Plan     Continue per POC, addressing mobility deficits and functional strengthening as tolerated.      Constanza Bae, PT

## 2020-08-07 ENCOUNTER — CLINICAL SUPPORT (OUTPATIENT)
Dept: REHABILITATION | Facility: HOSPITAL | Age: 55
End: 2020-08-07
Payer: OTHER GOVERNMENT

## 2020-08-07 DIAGNOSIS — R29.898 WEAKNESS OF RIGHT LOWER EXTREMITY: ICD-10-CM

## 2020-08-07 DIAGNOSIS — R26.9 GAIT ABNORMALITY: ICD-10-CM

## 2020-08-07 PROCEDURE — 97110 THERAPEUTIC EXERCISES: CPT | Mod: CQ

## 2020-08-07 PROCEDURE — 97140 MANUAL THERAPY 1/> REGIONS: CPT | Mod: CQ

## 2020-08-07 NOTE — PROGRESS NOTES
"  Physical Therapy Daily Treatment Note     Name: Oscar Oswald  Clinic Number: [id number]    Therapy Diagnosis:   Encounter Diagnoses   Name Primary?    Weakness of right lower extremity     Gait abnormality      Physician: Davi Lennon III, *    Visit Date: 8/7/2020  Physician Orders: PT Eval and Treat   Medical Diagnosis from Referral: S83.241A (ICD-10-CM) - Acute medial meniscus tear of right knee, initial encounter  Evaluation Date: 7/29/2020  Authorization Period Expiration: 6/29/2021  Plan of Care Expiration: 9/25/2020  Visit # / Visits authorized: 3/ 24    Time In: 0845  Time Out: 0930  Total Billable Time: 45 minutes    Precautions: Standard   7/28/2020: Partial meniscectomy    Subjective     Pt reports: feeling a lot better since riding stationary bike last tx. "it loosened it up". Stated cutting grass yesterday and compliant with RICE several times a day.  He was compliant with home exercise program.  Response to previous treatment: positive results with bike   Functional change: improved functional activity without pain     Pain: 0/10  Location: right knee      Objective     Oscar received therapeutic exercises to develop strength, endurance, ROM and flexibility for 35 minutes including:  Upright bike x10 min for improved mobility, circulation, and muscular endurance  QS heel prop 3x10  QS into hyperext 3x10  R SLR 3x10/3"  Bridges 3x10/3"  LAQ 20x5" 2#  Shuttle B LE press  3x10 62.5#  Shuttle R LE press 3x10 50#     Oscar received the following manual therapy techniques: Joint mobilizations were applied to the: R knee for 10 minutes, including:  Patellar mobilizations in all directions  Quad, heelcord stretch and hyperext    Oscar participated in dynamic functional therapeutic activities to improve functional performance for 00  minutes, including:      Home Exercises Provided and Patient Education Provided     Education provided:   - discussed gradual return to increased activity and RICE " compliance    Written Home Exercises Provided: Patient instructed to cont prior HEP.  Exercises were reviewed and Oscar was able to demonstrate them prior to the end of the session.  Oscar demonstrated good  understanding of the education provided.         Assessment   Pt tolerating tx well. Demonstrating improved TKE into hyperextension. VC/TC for correcting form/technique and for quad activation. Improved ROM and strength demonstrated with therex. Continue to progress towards goals.    Oscar is progressing well towards his goals.   Pt prognosis is Excellent.     Pt will continue to benefit from skilled outpatient physical therapy to address the deficits listed in the problem list box on initial evaluation, provide pt/family education and to maximize pt's level of independence in the home and community environment.     Pt's spiritual, cultural and educational needs considered and pt agreeable to plan of care and goals.    Anticipated barriers to physical therapy: none anticipated     Goals:   Short Term Goals: 4 weeks   - amb 2 miles on outdoors without pain provocation or need for rest  - do SLS >30 sec without LOB or pain  - do anterior step down of 6  without pain provocation  - demonstrate no measureable edema for proper healing     Long Term Goals: 8 weeks   - pt will be able to return to golf for return to prior level of function  - pt will be able to perform full work duties without increase in pain for functional improvement  - pt will demonstrate appropriate single leg control with unilateral activities for improved strength    Plan     Continue per POC, addressing mobility deficits and functional strengthening as tolerated.     Rob Tony, PTA, STS

## 2020-08-12 ENCOUNTER — OFFICE VISIT (OUTPATIENT)
Dept: SPORTS MEDICINE | Facility: CLINIC | Age: 55
End: 2020-08-12
Payer: OTHER GOVERNMENT

## 2020-08-12 ENCOUNTER — CLINICAL SUPPORT (OUTPATIENT)
Dept: REHABILITATION | Facility: HOSPITAL | Age: 55
End: 2020-08-12
Payer: OTHER GOVERNMENT

## 2020-08-12 VITALS
HEIGHT: 66 IN | WEIGHT: 160 LBS | SYSTOLIC BLOOD PRESSURE: 132 MMHG | DIASTOLIC BLOOD PRESSURE: 90 MMHG | BODY MASS INDEX: 25.71 KG/M2 | HEART RATE: 51 BPM

## 2020-08-12 DIAGNOSIS — R29.898 WEAKNESS OF RIGHT LOWER EXTREMITY: ICD-10-CM

## 2020-08-12 DIAGNOSIS — M25.461 EFFUSION OF RIGHT KNEE: ICD-10-CM

## 2020-08-12 DIAGNOSIS — R26.9 GAIT ABNORMALITY: ICD-10-CM

## 2020-08-12 DIAGNOSIS — Z98.890 S/P ARTHROSCOPIC SURGERY OF RIGHT KNEE: Primary | ICD-10-CM

## 2020-08-12 PROCEDURE — 20610 DRAIN/INJ JOINT/BURSA W/O US: CPT | Mod: 58,RT,S$GLB, | Performed by: PHYSICIAN ASSISTANT

## 2020-08-12 PROCEDURE — 99999 PR PBB SHADOW E&M-EST. PATIENT-LVL III: CPT | Mod: PBBFAC,,, | Performed by: PHYSICIAN ASSISTANT

## 2020-08-12 PROCEDURE — 99024 POSTOP FOLLOW-UP VISIT: CPT | Mod: S$GLB,,, | Performed by: PHYSICIAN ASSISTANT

## 2020-08-12 PROCEDURE — 20610 PR DRAIN/INJECT LARGE JOINT/BURSA: ICD-10-PCS | Mod: 58,RT,S$GLB, | Performed by: PHYSICIAN ASSISTANT

## 2020-08-12 PROCEDURE — 99999 PR PBB SHADOW E&M-EST. PATIENT-LVL III: ICD-10-PCS | Mod: PBBFAC,,, | Performed by: PHYSICIAN ASSISTANT

## 2020-08-12 PROCEDURE — 99024 PR POST-OP FOLLOW-UP VISIT: ICD-10-PCS | Mod: S$GLB,,, | Performed by: PHYSICIAN ASSISTANT

## 2020-08-12 PROCEDURE — 97110 THERAPEUTIC EXERCISES: CPT | Mod: CQ

## 2020-08-12 NOTE — PROGRESS NOTES
"  Physical Therapy Daily Treatment Note     Name: Oscar Oswald  Clinic Number: [id number]    Therapy Diagnosis:   Encounter Diagnoses   Name Primary?    Weakness of right lower extremity     Gait abnormality      Physician: Davi Lennon III, *    Visit Date: 8/12/2020  Physician Orders: PT Eval and Treat   Medical Diagnosis from Referral: S83.241A (ICD-10-CM) - Acute medial meniscus tear of right knee, initial encounter  Evaluation Date: 7/29/2020  Authorization Period Expiration: 6/29/2021  Plan of Care Expiration: 9/25/2020  Visit # / Visits authorized: 4/ 24    Time In: 0850  Time Out: 0915  Total Billable Time: 45 minutes    Precautions: Standard   7/28/2020: Partial meniscectomy    Subjective     Pt reports: no pain at present time but late due MD appointment prior to therapy. 12cc effused for R knee today by PA.   He was compliant with home exercise program.  Response to previous treatment: no pain   Functional change: improved functional activity without pain     Pain: 0/10  Location: right knee      Objective   20' late for tx due to MD appt prior to tx.     Oscar received therapeutic exercises to develop strength, endurance, ROM and flexibility for 25 minutes including:    Upright bike x 5 min for improved mobility, circulation, and muscular endurance  QS into hyperext 3x10  Shuttle B LE press  3x10 62.5#  Shuttle B heel raises 62.5# 3x10  Shuttle R LE press 3x10 50#   R SLR 3x10/3" 2#  Bridges 3x10/3"  LAQ 20x5" 2#      Oscar received the following manual therapy techniques: Joint mobilizations were applied to the: R knee for 0 minutes, including:  Patellar mobilizations in all directions  Quad, heelcord stretch and hyperext    Oscar participated in dynamic functional therapeutic activities to improve functional performance for 00  minutes, including:      Home Exercises Provided and Patient Education Provided     Education provided:   - discussed gradual return to increased activity and RICE " compliance    Written Home Exercises Provided: Patient instructed to cont prior HEP.  Exercises were reviewed and Oscar was able to demonstrate them prior to the end of the session.  Oscar demonstrated good  understanding of the education provided.         Assessment   Pt tolerating tx well. Demonstrating improved quad activation and control. Decreased fluid noted and R knee effusion today by PA. No pain during tx but min mm fatigue after tx. VC/TC for correcting form/technique and for quad activation. Continue to progress towards goals.    Oscar is progressing well towards his goals.   Pt prognosis is Excellent.     Pt will continue to benefit from skilled outpatient physical therapy to address the deficits listed in the problem list box on initial evaluation, provide pt/family education and to maximize pt's level of independence in the home and community environment.     Pt's spiritual, cultural and educational needs considered and pt agreeable to plan of care and goals.    Anticipated barriers to physical therapy: none anticipated     Goals:   Short Term Goals: 4 weeks   - amb 2 miles on outdoors without pain provocation or need for rest  - do SLS >30 sec without LOB or pain  - do anterior step down of 6  without pain provocation  - demonstrate no measureable edema for proper healing     Long Term Goals: 8 weeks   - pt will be able to return to golf for return to prior level of function  - pt will be able to perform full work duties without increase in pain for functional improvement  - pt will demonstrate appropriate single leg control with unilateral activities for improved strength    Plan     Continue per POC, addressing mobility deficits and functional strengthening as tolerated.     Rob Tony, PTA, STS

## 2020-08-13 NOTE — PROGRESS NOTES
HISTORY OF PRESENT ILLNESS:   Pt is here today for first post-operative followup of knee arthroscopy.  he is doing well.  We have reviewed his findings and discussed plan of care and future treatment options.      He is doing well.   Pain well controlled.   He reports intermittent knee swelling and feeling some fullness behind his patella.     DATE OF PROCEDURE: 07/28/2020     SURGEON:  Carole Winn M.D     PROCEDURE PERFORMED:   right  1. knee arthroscopic chondroplasty (CPT 67307)  2. knee arthroscopic medial and lateral (CPT 81741) meniscectomy   3. knee arthroscopic partial synovectomy/debridement (CPT 21101).   4. knee arthroscopic plica excision(CPT 87197).    5. Knee arthroscopic lysis of adhesions (CPT 70738)       In the patellofemoral compartment, there was chondral damage to:  Patella 15 x 10 mm grade 4  Chondroplasty was performed using arthroscopic shaver.     There was chondral damage to:  Medial femoral condyle 10 x 20 mm grade 2  Chondroplasty was performed using arthroscopic shaver.     There was chondral damage to:  Lateral tibial plateau 5 x 5 mm grade 1  Chondroplasty was performed using arthroscopic shaver.                                                                                  PHYSICAL EXAMINATION:     Incision sites healed well  No evidence of any erythema, infection or induration  Range of motion 0-130 degrees  Mild effusion  2+ DP pulse  No swelling, no calf tenderness  - Emre's sign  Negative medial joint line tendernes  Moderate quad atrophy                                                                                 ASSESSMENT:                                                                                                                                               1. Status post above, doing well.                                                                                                                               PLAN:                                                                                                                                                      1. Continue with PT.  Plan to hopefully return to work in 2 weeks.     Procedure Note:  After consent was obtained, using sterile technique the right knee was prepped with iodine. The knee joint was entered from the lateral suprapatellar approach and 13 ml's of serous colored fluid was withdrawn and not  sent for analysis.  The procedure was well tolerated.  The patient is asked to continue to rest the knee for a few more days before resuming regular activities.  It may be more painful for the first 1-2 days.  Watch for fever, or increased swelling or persistent pain in knee. Call or return to clinic prn if such symptoms occur or the knee fails to improve as anticipated.    Knee compression sleeve given.     2. Emphasized quad function.  3. I have discussed return to activity in detail.  4.Patient will see us back at 1 week post-op nomi.                                    5. All questions were answered and patient should contact us if he  has any questions or concerns in the interim.

## 2020-08-14 ENCOUNTER — CLINICAL SUPPORT (OUTPATIENT)
Dept: REHABILITATION | Facility: HOSPITAL | Age: 55
End: 2020-08-14
Payer: OTHER GOVERNMENT

## 2020-08-14 DIAGNOSIS — R26.9 GAIT ABNORMALITY: ICD-10-CM

## 2020-08-14 DIAGNOSIS — R29.898 WEAKNESS OF RIGHT LOWER EXTREMITY: ICD-10-CM

## 2020-08-14 PROCEDURE — 97110 THERAPEUTIC EXERCISES: CPT | Mod: CQ

## 2020-08-14 NOTE — PROGRESS NOTES
"  Physical Therapy Daily Treatment Note     Name: Oscar Oswald  Clinic Number: [id number]    Therapy Diagnosis:   Encounter Diagnoses   Name Primary?    Weakness of right lower extremity     Gait abnormality      Physician: Davi Lennon III, *    Visit Date: 8/14/2020  Physician Orders: PT Eval and Treat   Medical Diagnosis from Referral: S83.241A (ICD-10-CM) - Acute medial meniscus tear of right knee, initial encounter  Evaluation Date: 7/29/2020  Authorization Period Expiration: 6/29/2021  Plan of Care Expiration: 9/25/2020  Visit # / Visits authorized: 4/ 24    Time In: 0845  Time Out: 0930  Total Billable Time: 45 minutes    Precautions: Standard   7/28/2020: Partial meniscectomy    Subjective     Pt reports: no pain at present time   He was compliant with home exercise program.  Response to previous treatment: no pain   Functional change: improved functional activity without pain     Pain: 0/10  Location: right knee      Objective     Oscar received therapeutic exercises to develop strength, endurance, ROM and flexibility for 45 minutes including:    Upright bike x 10 min for improved mobility, circulation, and muscular endurance  QS into hyperext 3x10  Shuttle B LE press  3x10 75#  Shuttle B heel raises 75# 3x10  Shuttle R LE press 3x10 50#   Sit<>stand T/F 20" step 3x10  R knee 6" step up 3x10  R knee 6" lateral step up 3x10  TRX squats 3x10  Bridges 3x10  GTB clamshells 30x       RICE for R knee 10' for decreased circulation and edema after tx for increased recovery.     Home Exercises Provided and Patient Education Provided     Education provided:   - discussed gradual return to increased activity and RICE compliance    Written Home Exercises Provided: Patient instructed to cont prior HEP.  Exercises were reviewed and Oscar was able to demonstrate them prior to the end of the session.  Oscar demonstrated good  understanding of the education provided.         Assessment   Pt tolerating tx well.  " Improved progressing in excises without problems. Demonstrating good quad activation and control. No pain during tx with minimal mm fatigue after tx. VC/TC for correcting form/technique and for quad activation. Continue to progress towards goals.    Oscar is progressing well towards his goals.   Pt prognosis is Excellent.     Pt will continue to benefit from skilled outpatient physical therapy to address the deficits listed in the problem list box on initial evaluation, provide pt/family education and to maximize pt's level of independence in the home and community environment.     Pt's spiritual, cultural and educational needs considered and pt agreeable to plan of care and goals.    Anticipated barriers to physical therapy: none anticipated     Goals:   Short Term Goals: 4 weeks   - amb 2 miles on outdoors without pain provocation or need for rest (not met, progressing)  - do SLS >30 sec without LOB or pain(not met, progressing)  - do anterior step down of 6  without pain provocation(not met, progressing)  - demonstrate no measureable edema for proper healing(not met, progressing)     Long Term Goals: 8 weeks   - pt will be able to return to golf for return to prior level of function(not met, progressing)  - pt will be able to perform full work duties without increase in pain for functional improvement(not met, progressing)  - pt will demonstrate appropriate single leg control with unilateral activities for improved strength(not met, progressing)    Plan     Continue per POC, addressing mobility deficits and functional strengthening as tolerated.     Rob Tony, PTA, STS

## 2020-08-17 ENCOUNTER — CLINICAL SUPPORT (OUTPATIENT)
Dept: REHABILITATION | Facility: HOSPITAL | Age: 55
End: 2020-08-17
Payer: OTHER GOVERNMENT

## 2020-08-17 DIAGNOSIS — R26.9 GAIT ABNORMALITY: ICD-10-CM

## 2020-08-17 DIAGNOSIS — R29.898 WEAKNESS OF RIGHT LOWER EXTREMITY: ICD-10-CM

## 2020-08-17 PROCEDURE — 97110 THERAPEUTIC EXERCISES: CPT

## 2020-08-17 NOTE — PROGRESS NOTES
"  Physical Therapy Daily Treatment Note     Name: Oscar Oswald  Clinic Number: [id number]    Therapy Diagnosis:   Encounter Diagnoses   Name Primary?    Weakness of right lower extremity     Gait abnormality      Physician: Davi Lennon III, *    Visit Date: 8/17/2020  Physician Orders: PT Eval and Treat   Medical Diagnosis from Referral: S83.241A (ICD-10-CM) - Acute medial meniscus tear of right knee, initial encounter  Evaluation Date: 7/29/2020  Authorization Period Expiration: 6/29/2021  Plan of Care Expiration: 9/25/2020  Visit # / Visits authorized: 6/ 24    Time In: 1115  Time Out: 1200  Total Billable Time: 45 minutes    Precautions: Standard   7/28/2020: Partial meniscectomy    Subjective     Pt reports: he has been feeling good with no increase in swelling. He sees the doctor Wednesday and is hoping to be cleared for work.   He was compliant with home exercise program.  Response to previous treatment: no pain   Functional change: improved functional activity without pain     Pain: 0/10  Location: right knee      Objective     ROM: 5/0/130    Oscar received therapeutic exercises to develop strength, endurance, ROM and flexibility for 45 minutes including:    Upright bike x 10 min for improved mobility, circulation, and muscular endurance  QS into hyperext 3x10  SL Sit<>stand 20" step 3x10  6" lateral step down 3x10  Marching bridges 3x10  Lateral band walking x 3 laps    Home Exercises Provided and Patient Education Provided     Education provided:   - discussed gradual return to increased activity and RICE compliance    Written Home Exercises Provided: Patient instructed to cont prior HEP.  Exercises were reviewed and Oscar was able to demonstrate them prior to the end of the session.  Oscar demonstrated good  understanding of the education provided.         Assessment   Pt with good tolerance for higher level exercises today. Requires minimal cueing to prevent knee valgus with single leg squats " but able to correct. Pt given HEP for maintenance following likely discharge on Friday due to return to work. Pt with no goals of returning to plyometric activities.     Oscar is progressing well towards his goals.   Pt prognosis is Excellent.     Pt will continue to benefit from skilled outpatient physical therapy to address the deficits listed in the problem list box on initial evaluation, provide pt/family education and to maximize pt's level of independence in the home and community environment.     Pt's spiritual, cultural and educational needs considered and pt agreeable to plan of care and goals.    Anticipated barriers to physical therapy: none anticipated     Goals:   Short Term Goals: 4 weeks   - amb 2 miles on outdoors without pain provocation or need for rest (not met, progressing)  - do SLS >30 sec without LOB or pain(not met, progressing)  - do anterior step down of 6  without pain provocation(not met, progressing)  - demonstrate no measureable edema for proper healing(not met, progressing)     Long Term Goals: 8 weeks   - pt will be able to return to golf for return to prior level of function(not met, progressing)  - pt will be able to perform full work duties without increase in pain for functional improvement(not met, progressing)  - pt will demonstrate appropriate single leg control with unilateral activities for improved strength(not met, progressing)    Plan     Continue per POC, addressing mobility deficits and functional strengthening as tolerated.     Constanza Bae, PT

## 2020-08-19 ENCOUNTER — OFFICE VISIT (OUTPATIENT)
Dept: SPORTS MEDICINE | Facility: CLINIC | Age: 55
End: 2020-08-19
Payer: OTHER GOVERNMENT

## 2020-08-19 VITALS
BODY MASS INDEX: 25.71 KG/M2 | HEART RATE: 55 BPM | SYSTOLIC BLOOD PRESSURE: 138 MMHG | HEIGHT: 66 IN | DIASTOLIC BLOOD PRESSURE: 85 MMHG | WEIGHT: 160 LBS

## 2020-08-19 DIAGNOSIS — Z98.890 S/P ARTHROSCOPIC SURGERY OF RIGHT KNEE: Primary | ICD-10-CM

## 2020-08-19 PROCEDURE — 99999 PR PBB SHADOW E&M-EST. PATIENT-LVL III: ICD-10-PCS | Mod: PBBFAC,,, | Performed by: PHYSICIAN ASSISTANT

## 2020-08-19 PROCEDURE — 99024 PR POST-OP FOLLOW-UP VISIT: ICD-10-PCS | Mod: S$GLB,,, | Performed by: PHYSICIAN ASSISTANT

## 2020-08-19 PROCEDURE — 99024 POSTOP FOLLOW-UP VISIT: CPT | Mod: S$GLB,,, | Performed by: PHYSICIAN ASSISTANT

## 2020-08-19 PROCEDURE — 99999 PR PBB SHADOW E&M-EST. PATIENT-LVL III: CPT | Mod: PBBFAC,,, | Performed by: PHYSICIAN ASSISTANT

## 2020-08-20 NOTE — PROGRESS NOTES
HISTORY OF PRESENT ILLNESS:   Pt is here today for post-operative followup of knee arthroscopy.  he is doing well.  We have reviewed his findings and discussed plan of care and future treatment options.      He is doing well.   Pain well controlled.   He reports that his intermittent knee swelling has stopped following aspiration of knee last time.     No issues. Plans on returning to work as soon as possible.    DATE OF PROCEDURE: 07/28/2020     SURGEON:  Carole Winn M.D     PROCEDURE PERFORMED:   right  1. knee arthroscopic chondroplasty (CPT 62288)  2. knee arthroscopic medial and lateral (CPT 36197) meniscectomy   3. knee arthroscopic partial synovectomy/debridement (CPT 90410).   4. knee arthroscopic plica excision(CPT 98678).    5. Knee arthroscopic lysis of adhesions (CPT 63235)       In the patellofemoral compartment, there was chondral damage to:  Patella 15 x 10 mm grade 4  Chondroplasty was performed using arthroscopic shaver.     There was chondral damage to:  Medial femoral condyle 10 x 20 mm grade 2  Chondroplasty was performed using arthroscopic shaver.     There was chondral damage to:  Lateral tibial plateau 5 x 5 mm grade 1  Chondroplasty was performed using arthroscopic shaver.                                                                                  PHYSICAL EXAMINATION:     Incision sites healed well  No evidence of any erythema, infection or induration  Range of motion 0-140 degrees  Minimal effusion  2+ DP pulse  No swelling, no calf tenderness  - Emre's sign  Negative medial joint line tendernes  mild quad atrophy                                                                                 ASSESSMENT:                                                                                                                                               1. Status post above, doing well.                                                                                                                                PLAN:                                                                                                                                                     1. Continue with PT.  Can return to work on August 31st. Continue HEP for 1-2 months after return to work.    2. Emphasized quad function.  3. I have discussed return to activity in detail.  4.Patient will see us back prn                                   5. All questions were answered and patient should contact us if he  has any questions or concerns in the interim.

## 2020-08-21 ENCOUNTER — CLINICAL SUPPORT (OUTPATIENT)
Dept: REHABILITATION | Facility: HOSPITAL | Age: 55
End: 2020-08-21
Payer: OTHER GOVERNMENT

## 2020-08-21 DIAGNOSIS — R26.9 GAIT ABNORMALITY: ICD-10-CM

## 2020-08-21 DIAGNOSIS — R29.898 WEAKNESS OF RIGHT LOWER EXTREMITY: ICD-10-CM

## 2020-08-21 PROCEDURE — 97110 THERAPEUTIC EXERCISES: CPT | Mod: CQ

## 2020-08-21 NOTE — PROGRESS NOTES
"  Physical Therapy Daily Treatment Note     Name: Oscar Oswald  Clinic Number: [id number]    Therapy Diagnosis:   Encounter Diagnoses   Name Primary?    Weakness of right lower extremity     Gait abnormality      Physician: Davi Lennon III, *    Visit Date: 8/21/2020  Physician Orders: PT Eval and Treat   Medical Diagnosis from Referral: S83.241A (ICD-10-CM) - Acute medial meniscus tear of right knee, initial encounter  Evaluation Date: 7/29/2020  Authorization Period Expiration: 6/29/2021  Plan of Care Expiration: 9/25/2020  Visit # / Visits authorized: 7/ 24    Time In: 0825  Time Out: 0910  Total Billable Time: 45 minutes    Precautions: Standard   7/28/2020: Partial meniscectomy    Subjective     Pt reports: no pain in R knee at present time.   He was compliant with home exercise program.  Response to previous treatment: min glut soreness after last tx  Functional change: improved functional activity without pain     Pain: 0/10  Location: right knee      Objective   8/21/20  R knee PROM -7 hyperext and 145'  R knee AROM -5 hyperext and 138'     MMT R knee flex 5/5 and knee ext 5/5    Oscar received therapeutic exercises to develop strength, endurance, ROM and flexibility for 45 minutes including:    Elliptical x 10 min for improved mobility, circulation, and muscular endurance  QS into hyperext 3x10  Marching bridges 3x10  B sit<>stand 20" step 3x10  SL Sit<>stand 20" step 3x10 w/pole   6" lateral step down 3x10 np  Lateral band walking GTB x 3 laps    Home Exercises Provided and Patient Education Provided     Education provided:   - discussed gradual return to increased activity and RICE compliance    Written Home Exercises Provided: Patient instructed to cont prior HEP.  Exercises were reviewed and Oscar was able to demonstrate them prior to the end of the session.  Oscar demonstrated good  understanding of the education provided.     Assessment   Pt tolerating tx well with no increased R knee pain " during tx but noted Gluteal soreness. Improved SL squats with decreased  knee valgus. Good knee strength and ROM with measurement today. Pt received advance HEP from Supervising PT last visit. Pt D/C from PT.   Oscar is progressing well towards his goals.   Pt prognosis is Excellent.     Pt will continue to benefit from skilled outpatient physical therapy to address the deficits listed in the problem list box on initial evaluation, provide pt/family education and to maximize pt's level of independence in the home and community environment.     Pt's spiritual, cultural and educational needs considered and pt agreeable to plan of care and goals.    Anticipated barriers to physical therapy: none anticipated     Goals:   Short Term Goals: 4 weeks   - amb 2 miles on outdoors without pain provocation or need for rest (not met, progressing)  - do SLS >30 sec without LOB or pain(not met, progressing)  - do anterior step down of 6  without pain provocation(not met, progressing)  - demonstrate no measureable edema for proper healing(not met, progressing)     Long Term Goals: 8 weeks   - pt will be able to return to golf for return to prior level of function(not met, progressing)  - pt will be able to perform full work duties without increase in pain for functional improvement(not met, progressing)  - pt will demonstrate appropriate single leg control with unilateral activities for improved strength(not met, progressing)    Plan     Continue per POC, addressing mobility deficits and functional strengthening as tolerated.     Rob Tony, PTA, STS

## (undated) DEVICE — GLOVE SURGEON SYN PF SZ 9

## (undated) DEVICE — UNDERGLOVES BIOGEL PI SIZE 7.5

## (undated) DEVICE — GOWN SMARTGOWN LVL4 X-LONG XL

## (undated) DEVICE — SYR B-D DISP CONTROL 10CC100/C

## (undated) DEVICE — ADHESIVE MASTISOL VIAL 48/BX

## (undated) DEVICE — SUT MCRYL PLUS 4-0 PS2 27IN

## (undated) DEVICE — SOL 9P NACL IRR PIC IL

## (undated) DEVICE — GLOVE ORTHO PF SZ 8.5

## (undated) DEVICE — GOWN SMART IMP BREATHABLE XXLG

## (undated) DEVICE — APPLICATOR CHLORAPREP ORN 26ML

## (undated) DEVICE — BLADE 4.2MM PREBENT ULTRACUT

## (undated) DEVICE — NDL HYPO REG 25G X 1 1/2

## (undated) DEVICE — Device

## (undated) DEVICE — SOL IRR NACL .9% 3000ML

## (undated) DEVICE — GLOVE BIOGEL SKINSENSE PI 7.0

## (undated) DEVICE — SEE MEDLINE ITEM 147518

## (undated) DEVICE — GAUZE SPONGE 4X4 12PLY

## (undated) DEVICE — PAD ABD 8X10 STERILE

## (undated) DEVICE — CLOSURE SKIN STERI STRIP 1/2X4

## (undated) DEVICE — PAD ELECTRODE STER 1.5X3

## (undated) DEVICE — DRESSING XEROFORM FOIL PK 1X8

## (undated) DEVICE — SYR 10CC LUER LOCK

## (undated) DEVICE — SEE MEDLINE ITEM 157169

## (undated) DEVICE — COVER MAYO STAND REINFRCD 30

## (undated) DEVICE — TUBE SET INFLOW/OUTFLOW